# Patient Record
Sex: FEMALE | Race: WHITE | Employment: PART TIME | URBAN - METROPOLITAN AREA
[De-identification: names, ages, dates, MRNs, and addresses within clinical notes are randomized per-mention and may not be internally consistent; named-entity substitution may affect disease eponyms.]

---

## 2017-05-31 ENCOUNTER — HOSPITAL ENCOUNTER (EMERGENCY)
Age: 42
Discharge: HOME | End: 2017-05-31
Attending: EMERGENCY MEDICINE
Payer: COMMERCIAL

## 2017-05-31 ENCOUNTER — APPOINTMENT (OUTPATIENT)
Dept: GENERAL RADIOLOGY | Age: 42
End: 2017-05-31
Attending: EMERGENCY MEDICINE
Payer: COMMERCIAL

## 2017-05-31 VITALS
BODY MASS INDEX: 29.02 KG/M2 | OXYGEN SATURATION: 99 % | HEIGHT: 64 IN | WEIGHT: 170 LBS | SYSTOLIC BLOOD PRESSURE: 136 MMHG | RESPIRATION RATE: 16 BRPM | DIASTOLIC BLOOD PRESSURE: 75 MMHG | TEMPERATURE: 97.9 F | HEART RATE: 91 BPM

## 2017-05-31 DIAGNOSIS — J06.9 ACUTE UPPER RESPIRATORY INFECTION: ICD-10-CM

## 2017-05-31 DIAGNOSIS — J02.9 ACUTE PHARYNGITIS, UNSPECIFIED ETIOLOGY: Primary | ICD-10-CM

## 2017-05-31 LAB — DEPRECATED S PYO AG THROAT QL EIA: NEGATIVE

## 2017-05-31 PROCEDURE — 71020 XR CHEST PA LAT: CPT

## 2017-05-31 PROCEDURE — 74011636637 HC RX REV CODE- 636/637: Performed by: EMERGENCY MEDICINE

## 2017-05-31 PROCEDURE — 87070 CULTURE OTHR SPECIMN AEROBIC: CPT | Performed by: EMERGENCY MEDICINE

## 2017-05-31 PROCEDURE — 99283 EMERGENCY DEPT VISIT LOW MDM: CPT

## 2017-05-31 PROCEDURE — 87880 STREP A ASSAY W/OPTIC: CPT | Performed by: EMERGENCY MEDICINE

## 2017-05-31 RX ORDER — PREDNISONE 20 MG/1
40 TABLET ORAL
Status: COMPLETED | OUTPATIENT
Start: 2017-05-31 | End: 2017-05-31

## 2017-05-31 RX ORDER — PREDNISONE 20 MG/1
20 TABLET ORAL DAILY
Qty: 5 TAB | Refills: 0 | Status: SHIPPED | OUTPATIENT
Start: 2017-05-31 | End: 2017-06-05

## 2017-05-31 RX ADMIN — PREDNISONE 40 MG: 20 TABLET ORAL at 21:02

## 2017-06-01 NOTE — DISCHARGE INSTRUCTIONS
Sore Throat: Care Instructions  Your Care Instructions    Infection by bacteria or a virus causes most sore throats. Cigarette smoke, dry air, air pollution, allergies, and yelling can also cause a sore throat. Sore throats can be painful and annoying. Fortunately, most sore throats go away on their own. If you have a bacterial infection, your doctor may prescribe antibiotics. Follow-up care is a key part of your treatment and safety. Be sure to make and go to all appointments, and call your doctor if you are having problems. It's also a good idea to know your test results and keep a list of the medicines you take. How can you care for yourself at home? · If your doctor prescribed antibiotics, take them as directed. Do not stop taking them just because you feel better. You need to take the full course of antibiotics. · Gargle with warm salt water once an hour to help reduce swelling and relieve discomfort. Use 1 teaspoon of salt mixed in 1 cup of warm water. · Take an over-the-counter pain medicine, such as acetaminophen (Tylenol), ibuprofen (Advil, Motrin), or naproxen (Aleve). Read and follow all instructions on the label. · Be careful when taking over-the-counter cold or flu medicines and Tylenol at the same time. Many of these medicines have acetaminophen, which is Tylenol. Read the labels to make sure that you are not taking more than the recommended dose. Too much acetaminophen (Tylenol) can be harmful. · Drink plenty of fluids. Fluids may help soothe an irritated throat. Hot fluids, such as tea or soup, may help decrease throat pain. · Use over-the-counter throat lozenges to soothe pain. Regular cough drops or hard candy may also help. These should not be given to young children because of the risk of choking. · Do not smoke or allow others to smoke around you. If you need help quitting, talk to your doctor about stop-smoking programs and medicines.  These can increase your chances of quitting for good. · Use a vaporizer or humidifier to add moisture to your bedroom. Follow the directions for cleaning the machine. When should you call for help? Call your doctor now or seek immediate medical care if:  · You have new or worse trouble swallowing. · Your sore throat gets much worse on one side. Watch closely for changes in your health, and be sure to contact your doctor if you do not get better as expected. Where can you learn more? Go to http://audra-joya.info/. Enter [id number] [id number] in the search box to learn more about \"Sore Throat: Care Instructions. \"  Current as of: July 29, 2016  Content Version: 11.2  © 1979-0984 [employer]. Care instructions adapted under license by [email or web address] (which disclaims liability or warranty for this information). If you have questions about a medical condition or this instruction, always ask your healthcare professional. [place] [address] any warranty or liability for your use of this information. Upper Respiratory Infection (Cold): Care Instructions  Your Care Instructions    An upper respiratory infection, or URI, is an infection of the nose, sinuses, or throat. URIs are spread by coughs, sneezes, and direct contact. The common cold is the most frequent kind of URI. The flu and sinus infections are other kinds of URIs. Almost all URIs are caused by viruses. Antibiotics won't cure them. But you can treat most infections with home care. This may include drinking lots of fluids and taking over-the-counter pain medicine. You will probably feel better in 4 to 10 days. The doctor has checked you carefully, but problems can develop later. If you notice any problems or new symptoms, get medical treatment right away. Follow-up care is a key part of your treatment and safety. Be sure to make and go to all appointments, and call your doctor if you are having problems.  It's also a good idea to know your test results and keep a list of the medicines you take. How can you care for yourself at home? · To prevent dehydration, drink plenty of fluids, enough so that your urine is light yellow or clear like water. Choose water and other caffeine-free clear liquids until you feel better. If you have kidney, heart, or liver disease and have to limit fluids, talk with your doctor before you increase the amount of fluids you drink. · Take an over-the-counter pain medicine, such as acetaminophen (Tylenol), ibuprofen (Advil, Motrin), or naproxen (Aleve). Read and follow all instructions on the label. · Before you use cough and cold medicines, check the label. These medicines may not be safe for young children or for people with certain health problems. · Be careful when taking over-the-counter cold or flu medicines and Tylenol at the same time. Many of these medicines have acetaminophen, which is Tylenol. Read the labels to make sure that you are not taking more than the recommended dose. Too much acetaminophen (Tylenol) can be harmful. · Get plenty of rest.  · Do not smoke or allow others to smoke around you. If you need help quitting, talk to your doctor about stop-smoking programs and medicines. These can increase your chances of quitting for good. When should you call for help? Call 911 anytime you think you may need emergency care. For example, call if:  · You have severe trouble breathing. Call your doctor now or seek immediate medical care if:  · You seem to be getting much sicker. · You have new or worse trouble breathing. · You have a new or higher fever. · You have a new rash. Watch closely for changes in your health, and be sure to contact your doctor if:  · You have a new symptom, such as a sore throat, an earache, or sinus pain. · You cough more deeply or more often, especially if you notice more mucus or a change in the color of your mucus. · You do not get better as expected. Where can you learn more?   Go to http://audra-joya.info/. Enter B778 in the search box to learn more about \"Upper Respiratory Infection (Cold): Care Instructions. \"  Current as of: June 30, 2016  Content Version: 11.2  © 2373-7308 [employer]. Care instructions adapted under license by [employer] (which disclaims liability or warranty for this information). If you have questions about a medical condition or this instruction, always ask your healthcare professional. [address] any warranty or liability for your use of this information.

## 2017-06-01 NOTE — ED PROVIDER NOTES
HPI Comments: 46 y.o. female with past medical history significant for cystic fibrosis carrier, asthma, and RA who presents from home with chief complaint of sore throat. Pt states that for the past 6 days she has been experiencing sore throat with a productive cough of green sputum, wheezing and CP with cough. Pt also notes that she has had a fever which has since resolved for the past 2 days. Pt claims that today her right tonsil became very swollen and sore, prompting her to take Advil which finally brought the swelling down. Pt reports that she has been taking Mucinex and Sudafed as well. Pt denies SOB, vomiting or diarrhea. Pt denies hx of PNA. There are no other acute medical concerns at this time. PCP: Harsha Chávez MD    Note written by Isidro Hughes. Danita Potts, as dictated by Merry Jarrett MD 8:07 PM       The history is provided by the patient. No  was used. Past Medical History:   Diagnosis Date    Asthma     as a child    Chronic pain     hands, knees    Cystic fibrosis carrier     Hypoglycemia     PUD (peptic ulcer disease)     [facility])     Dr. Lemus Many Unspecified adverse effect of anesthesia     \"anesthesia difficult to kick in.\"       Past Surgical History:   Procedure Laterality Date    HX GYN  9/2014    uterine ablation    HX KNEE ARTHROSCOPY Left 05/2016    meniscal repair    HX ORTHOPAEDIC Right 2004    right hand, nerve repair    HX TYMPANOSTOMY Bilateral     HX UROLOGICAL  6/13/14    Urodynamics         Family History:   Problem Relation Age of Onset   26 [facility] Arthritis-rheumatoid Mother     Diabetes Mother     SLE Mother     High Cholesterol Father     Coronary Artery Disease Paternal Grandfather        Social History     Social History    Marital status:      Spouse name: N/A    Number of children: N/A    Years of education: N/A     Occupational History    Not on file.      Social History Main Topics    Smoking status: Former Smoker     Packs/day: 0.25     Years: 4.00     Quit date: 5/14/1999    Smokeless tobacco: Never Used    Alcohol use 0.0 oz/week      Comment: occasionally    Drug use: No    Sexual activity: Yes     Partners: Male     Birth control/ protection: Surgical      Comment: vasectomy     Other Topics Concern    Not on file     Social History Narrative         ALLERGIES: Sulfa (sulfonamide antibiotics)    Review of Systems   Constitutional: Positive for fever. HENT: Positive for sore throat. Eyes: Negative for visual disturbance. Respiratory: Positive for cough and wheezing. Negative for shortness of breath. Cardiovascular: Negative for leg swelling. Gastrointestinal: Negative for abdominal pain, diarrhea, nausea and vomiting. Genitourinary: Negative for dysuria. Musculoskeletal: Negative. Negative for back pain and neck stiffness. Skin: Negative for rash. Neurological: Negative. Negative for syncope and headaches. Psychiatric/Behavioral: Negative for confusion. All other systems reviewed and are negative. Vitals:    05/31/17 2002   BP: 136/75   Pulse: 91   Resp: 16   Temp: 97.9 °F (36.6 °C)   SpO2: 99%   Weight: 77.1 kg (170 lb)   Height: 5' 4\" (1.626 m)            Physical Exam   Constitutional: She appears well-developed and well-nourished. No distress. HENT:   Head: Normocephalic. Oral pharynx is red, no exudates or swelling of the tonsils. Eyes: Pupils are equal, round, and reactive to light. Neck: Normal range of motion. Cardiovascular: Normal rate and regular rhythm. No murmur heard. Pulmonary/Chest: Effort normal and breath sounds normal. No respiratory distress. Abdominal: Soft. There is no tenderness. Musculoskeletal: Normal range of motion. She exhibits no edema. Lymphadenopathy:     She has cervical adenopathy (mild swelling of the right anterior nodes). Neurological: She is alert. She has normal strength. No cranial nerve deficit.    Skin: Skin is warm and dry. Psychiatric: She has a normal mood and affect. Her behavior is normal.   Nursing note and vitals reviewed. Note written by Claude Alexander. Jameson Jo, as dictated by Jose Vinson MD 8:07 PM      [facility]  ED Course       Procedures    Discussed test results, clinical impression,  and need for followup in 1-2 days if not improving. Patients questions were answered. Discharge instructions given to patient.

## 2017-06-01 NOTE — ED TRIAGE NOTES
Pt c/o sore throat, cough, and swollen tonsils since Thursday. She has taken OTC medications but it has not helped.

## 2017-06-02 ENCOUNTER — TELEPHONE (OUTPATIENT)
Dept: INTERNAL MEDICINE CLINIC | Age: 42
End: 2017-06-02

## 2017-06-02 LAB
BACTERIA SPEC CULT: NORMAL
SERVICE CMNT-IMP: NORMAL

## 2017-06-02 NOTE — TELEPHONE ENCOUNTER
Patient wants to speak with nurse regarding her throat. She has been but on Prednisone and has been to the ER for this as well and she is not any better wants to see Dr Ferny Weston today because the weekend is here.  Her no is [phone]

## 2017-06-02 NOTE — TELEPHONE ENCOUNTER
LM informing patient to finish taking the medication as prescribed. She can call back to schedule an appointment for Monday if still no better or go to an urgent care facility if become worse over the weekend.

## 2017-06-28 ENCOUNTER — TELEPHONE (OUTPATIENT)
Dept: OBGYN CLINIC | Age: 42
End: 2017-06-28

## 2017-06-28 NOTE — TELEPHONE ENCOUNTER
Patient calling stating that she has left breast tenderness and sometimes pain that is 4 out of 10 pain scale and located at 3:00 on the left breast.  Patient noticed that each month she would have this pain with her cycles but just a few days ago, the pain seems to come and go without her cycles now and would like to come in and be seen. Based on patient's scheduled, appt scheduled for 07/03/17.

## 2017-07-03 ENCOUNTER — OFFICE VISIT (OUTPATIENT)
Dept: OBGYN CLINIC | Age: 42
End: 2017-07-03

## 2017-07-03 VITALS
BODY MASS INDEX: 29.02 KG/M2 | HEIGHT: 64 IN | DIASTOLIC BLOOD PRESSURE: 80 MMHG | SYSTOLIC BLOOD PRESSURE: 116 MMHG | WEIGHT: 170 LBS

## 2017-07-03 DIAGNOSIS — N64.4 BREAST PAIN IN FEMALE: Primary | ICD-10-CM

## 2017-07-03 NOTE — PATIENT INSTRUCTIONS
Breast Self-Exam: Care Instructions  Your Care Instructions  A breast self-exam is when you check your breasts for lumps or changes. This regular exam helps you learn how your breasts normally look and feel. Most breast problems or changes are not because of cancer. Breast self-exam is not a substitute for a mammogram. Having regular breast exams by your doctor and regular mammograms improve your chances of finding any problems with your breasts. Some women set a time each month to do a step-by-step breast self-exam. Other women like a less formal system. They might look at their breasts as they brush their teeth, or feel their breasts once in a while in the shower. If you notice a change in your breast, tell your doctor. Follow-up care is a key part of your treatment and safety. Be sure to make and go to all appointments, and call your doctor if you are having problems. Its also a good idea to know your test results and keep a list of the medicines you take. How do you do a breast self-exam?  · The best time to examine your breasts is usually one week after your menstrual period begins. Your breasts should not be tender then. If you do not have periods, you might do your exam on a day of the month that is easy to remember. · To examine your breasts:  ¨ Remove all your clothes above the waist and lie down. When you are lying down, your breast tissue spreads evenly over your chest wall, which makes it easier to feel all your breast tissue. ¨ Use the padsnot the fingertipsof the 3 middle fingers of your left hand to check your right breast. Move your fingers slowly in small coin-sized circles that overlap. ¨ Use three levels of pressure to feel of all your breast tissue. Use light pressure to feel the tissue close to the skin surface. Use medium pressure to feel a little deeper. Use firm pressure to feel your tissue close to your breastbone and ribs.  Use each pressure level to feel your breast tissue before moving on to the next spot. ¨ Check your entire breast, moving up and down as if following a strip from the collarbone to the bra line, and from the armpit to the ribs. Repeat until you have covered the entire breast.  ¨ Repeat this procedure for your left breast, using the pads of the 3 middle fingers of your right hand. · To examine your breasts while in the shower:  ¨ Place one arm over your head and lightly soap your breast on that side. ¨ Using the pads of your fingers, gently move your hand over your breast (in the strip pattern described above), feeling carefully for any lumps or changes. ¨ Repeat for the other breast.  · Have your doctor inspect anything you notice to see if you need further testing. Where can you learn more? Go to http://audra-joya.info/. Enter P148 in the search box to learn more about \"Breast Self-Exam: Care Instructions. \"  Current as of: July 26, 2016  Content Version: 11.3  © 0268-3212 [employer]. Care instructions adapted under license by [employer] (which disclaims liability or warranty for this information). If you have questions about a medical condition or this instruction, always ask your healthcare professional. [place] [address] any warranty or liability for your use of this information.

## 2017-07-03 NOTE — PROGRESS NOTES
Breast Pain Evaluation    Erick Rivera is a ,  46 y.o. female [facility] Patient's last menstrual period was 2017. She presents with breast pain located in the left breast at 3 o'clock. She noticed it several months ago. The pain has not changed in intensity. The patient has not noticed changes in the area of the pain: No dimpling, nipple retraction or discharge. No masses or nodes. .    The patient notes the following associated symptoms: breast pain bilateral--during her cycle, but the L breast pain has lingered after menses stopped. She notes that the following factors improve her symptoms: none    She notes that the following factors aggravate her symptoms:none    She has had any diagnostic studies for this problem since she noticed it. With regards to breast problems her medical history is significant for the following: none    There is not a family history of breast cancer in a first and second degree relative. Past Medical History:   Diagnosis Date    Asthma     as a child    Chronic pain     hands, knees    Cystic fibrosis carrier     Hypoglycemia     PUD (peptic ulcer disease)     RA [facility]he[facility]oid [facility]) ([facility]     Dr. Debra Benoit Unspecified adverse effect of anesthesia     \"anesthesia difficult to kick in.\"     Past Surgical History:   Procedure Laterality Date    HX GYN  2014    uterine ablation    HX KNEE ARTHROSCOPY Left 2016    meniscal repair    HX ORTHOPAEDIC Right     right hand, nerve repair    HX TYMPANOSTOMY Bilateral     HX UROLOGICAL  14    Urodynamics     Social History     Occupational History    Not on file.      Social History Main Topics    Smoking status: Former Smoker     Packs/day: 0.25     Years: 4.00     Quit date: 1999    Smokeless tobacco: Never Used    Alcohol use 0.0 oz/week      Comment: occasionally    Drug use: No    Sexual activity: Yes     Partners: Male     Birth control/ protection: Surgical Comment: vasectomy     Family History   Problem Relation Age of Onset   [facility] Arthritis-rheumatoid Mother     Diabetes Mother     SLE Mother     High Cholesterol Father     Coronary Artery Disease Paternal Grandfather        Allergies   Allergen Reactions    Sulfa (Sulfonamide Antibiotics) Other (comments)     As a child. \" Not sure what happened, possibly some breathing problems. \"     Prior to Admission medications    Medication Sig Start Date End Date Taking? Authorizing Provider   betamethasone, augmented (DIPROLENE) 0.05 % lotion Apply  to affected area two (2) times a day. 9/13/16   Shin Quiroz, NP   cefUROXime (CEFTIN) 500 mg tablet Take 1 Tab by mouth two (2) times a day. 9/13/16   Shin Quiroz NP   PV W-O DAMON/FERROUS FUMARATE/FA (M-VIT PO) take  by mouth.     Historical Provider        Review of Systems: History obtained from the patient  Constitutional: negative for weight loss, fever, night sweats  HEENT: negative for hearing loss, earache, congestion, snoring, sorethroat  CV: negative for chest pain, palpitations, edema  Resp: negative for cough, shortness of breath, wheezing  Breast: see above  GI: negative for change in bowel habits, abdominal pain, black or bloody stools  : negative for frequency, dysuria, hematuria, vaginal discharge  MSK: negative for back pain, joint pain, muscle pain  Skin: negative for itching, rash, hives  Neuro: negative for dizziness, headache, confusion, weakness  Psych: negative for anxiety, depression, change in mood  Heme/lymph: negative for bleeding, bruising, pallor    Objective:  Visit Vitals    /80    Ht 5' 4\" (1.626 m)    Wt 170 lb (77.1 kg)    BMI 29.18 kg/m2     Exam:  Constitutional  · Appearance: well-nourished, well developed, alert, in no acute distress    HENT  · Head and Face: appears normal    Neck  · Inspection/Palpation: normal appearance, no masses or tenderness  · Lymph Nodes: no lymphadenopathy present  · Thyroid: gland size normal, nontender, no nodules or masses present on palpation    Breasts  · Inspection of Breasts: breasts symmetrical, no skin changes, no discharge present, nipple appearance normal, no skin retraction present  · Palpation of Breasts and Axillae: no masses present on palpation, no breast tenderness  · Axillary Lymph Nodes: no lymphadenopathy present    Assessment:  Breast pain, normal exam  Plan:  Mammo/AE  Try vit E, primrose oil

## 2017-07-03 NOTE — MR AVS SNAPSHOT
Visit Information Date & Time Provider Department Dept. Phone Encounter #  
 7/3/2017  9:50 AM Desire Peacock MD [facility] [phone] [id number] Upcoming Health Maintenance Date Due  
 PAP AKA CERVICAL CYTOLOGY 1/1/2017 INFLUENZA AGE 9 TO ADULT 8/1/2017 Allergies as of 7/3/2017  Review Complete On: 7/3/2017 By: Bessie Fields Severity Noted Reaction Type Reactions Sulfa (Sulfonamide Antibiotics)  05/14/2009    Other (comments) As a child. \" Not sure what happened, possibly some breathing problems. \" Current Immunizations  Reviewed on 12/10/2014 Name Date Influenza Vaccine Whole 9/19/2012 Tdap 3/27/2013 Not reviewed this visit Vitals BP Height(growth percentile) Weight(growth percentile) LMP BMI OB Status 116/80 5' 4\" (1.626 m) 170 lb (77.1 kg) 06/26/2017 29.18 kg/m2 Having regular periods Smoking Status Former Smoker BMI and BSA Data Body Mass Index Body Surface Area  
 29.18 kg/m 2 1.87 m 2 Preferred Pharmacy Pharmacy Name Phone [facility] [address]  [facility] [phone] Your Updated Medication List  
  
   
This list is accurate as of: 7/3/17 10:15 AM.  Always use your most recent med list.  
  
  
  
  
 betamethasone, augmented 0.05 % lotion Commonly known as:  Sharwill Marla Apply  to affected area two (2) times a day. cefUROXime 500 mg tablet Commonly known as:  CEFTIN Take 1 Tab by mouth two (2) times a day. M-VIT PO  
take  by mouth. Patient Instructions Breast Self-Exam: Care Instructions Your Care Instructions A breast self-exam is when you check your breasts for lumps or changes. This regular exam helps you learn how your breasts normally look and feel. Most breast problems or changes are not because of cancer.  
Breast self-exam is not a substitute for a mammogram. Having regular breast exams by your doctor and regular mammograms improve your chances of finding any problems with your breasts. Some women set a time each month to do a step-by-step breast self-exam. Other women like a less formal system. They might look at their breasts as they brush their teeth, or feel their breasts once in a while in the shower. If you notice a change in your breast, tell your doctor. Follow-up care is a key part of your treatment and safety. Be sure to make and go to all appointments, and call your doctor if you are having problems. Its also a good idea to know your test results and keep a list of the medicines you take. How do you do a breast self-exam? 
· The best time to examine your breasts is usually one week after your menstrual period begins. Your breasts should not be tender then. If you do not have periods, you might do your exam on a day of the month that is easy to remember. · To examine your breasts: ¨ Remove all your clothes above the waist and lie down. When you are lying down, your breast tissue spreads evenly over your chest wall, which makes it easier to feel all your breast tissue. ¨ Use the padsnot the fingertipsof the 3 middle fingers of your left hand to check your right breast. Move your fingers slowly in small coin-sized circles that overlap. ¨ Use three levels of pressure to feel of all your breast tissue. Use light pressure to feel the tissue close to the skin surface. Use medium pressure to feel a little deeper. Use firm pressure to feel your tissue close to your breastbone and ribs. Use each pressure level to feel your breast tissue before moving on to the next spot. ¨ Check your entire breast, moving up and down as if following a strip from the collarbone to the bra line, and from the armpit to the ribs. Repeat until you have covered the entire breast. 
¨ Repeat this procedure for your left breast, using the pads of the 3 middle fingers of your right hand. · To examine your breasts while in the shower: 
¨ Place one arm over your head and lightly soap your breast on that side. ¨ Using the pads of your fingers, gently move your hand over your breast (in the strip pattern described above), feeling carefully for any lumps or changes. ¨ Repeat for the other breast. 
· Have your doctor inspect anything you notice to see if you need further testing. Where can you learn more? Go to http://audra-joya.info/. Enter P148 in the search box to learn more about \"Breast Self-Exam: Care Instructions. \" Current as of: July 26, 2016 Content Version: 11.3 © 0581-1771 [email or web address]. Care instructions adapted under license by [employer] (which disclaims liability or warranty for this information). If you have questions about a medical condition or this instruction, always ask your healthcare professional. [address] any warranty or liability for your use of this information. Please provide this summary of care documentation to your next provider. Your primary care clinician is listed as Jory Zacarias. If you have any questions after today's visit, please call [phone].

## 2017-08-24 ENCOUNTER — OFFICE VISIT (OUTPATIENT)
Dept: OBGYN CLINIC | Age: 42
End: 2017-08-24

## 2017-08-24 VITALS
BODY MASS INDEX: 30.39 KG/M2 | HEIGHT: 64 IN | WEIGHT: 178 LBS | SYSTOLIC BLOOD PRESSURE: 110 MMHG | DIASTOLIC BLOOD PRESSURE: 62 MMHG

## 2017-08-24 DIAGNOSIS — Z01.419 ROUTINE GYNECOLOGICAL EXAMINATION: Primary | ICD-10-CM

## 2017-08-24 DIAGNOSIS — Z11.51 SPECIAL SCREENING EXAMINATION FOR HUMAN PAPILLOMAVIRUS (HPV): ICD-10-CM

## 2017-08-24 NOTE — PATIENT INSTRUCTIONS
Breast Self-Exam: Care Instructions  Your Care Instructions  A breast self-exam is when you check your breasts for lumps or changes. This regular exam helps you learn how your breasts normally look and feel. Most breast problems or changes are not because of cancer. Breast self-exam is not a substitute for a mammogram. Having regular breast exams by your doctor and regular mammograms improve your chances of finding any problems with your breasts. Some women set a time each month to do a step-by-step breast self-exam. Other women like a less formal system. They might look at their breasts as they brush their teeth, or feel their breasts once in a while in the shower. If you notice a change in your breast, tell your doctor. Follow-up care is a key part of your treatment and safety. Be sure to make and go to all appointments, and call your doctor if you are having problems. Its also a good idea to know your test results and keep a list of the medicines you take. How do you do a breast self-exam?  · The best time to examine your breasts is usually one week after your menstrual period begins. Your breasts should not be tender then. If you do not have periods, you might do your exam on a day of the month that is easy to remember. · To examine your breasts:  ¨ Remove all your clothes above the waist and lie down. When you are lying down, your breast tissue spreads evenly over your chest wall, which makes it easier to feel all your breast tissue. ¨ Use the pads--not the fingertips--of the 3 middle fingers of your left hand to check your right breast. Move your fingers slowly in small coin-sized circles that overlap. ¨ Use three levels of pressure to feel of all your breast tissue. Use light pressure to feel the tissue close to the skin surface. Use medium pressure to feel a little deeper. Use firm pressure to feel your tissue close to your breastbone and ribs.  Use each pressure level to feel your breast tissue before moving on to the next spot. ¨ Check your entire breast, moving up and down as if following a strip from the collarbone to the bra line, and from the armpit to the ribs. Repeat until you have covered the entire breast.  ¨ Repeat this procedure for your left breast, using the pads of the 3 middle fingers of your right hand. · To examine your breasts while in the shower:  ¨ Place one arm over your head and lightly soap your breast on that side. ¨ Using the pads of your fingers, gently move your hand over your breast (in the strip pattern described above), feeling carefully for any lumps or changes. ¨ Repeat for the other breast.  · Have your doctor inspect anything you notice to see if you need further testing. Where can you learn more? Go to http://audra-joya.info/. Enter P148 in the search box to learn more about \"Breast Self-Exam: Care Instructions. \"  Current as of: July 26, 2016  Content Version: 11.3  © 8076-2227 [employer]. Care instructions adapted under license by "[employer] (which disclaims liability or warranty for this information). If you have questions about a medical condition or this instruction, always ask your healthcare professional. [place] [address] any warranty or liability for your use of this information.

## 2017-08-24 NOTE — PROGRESS NOTES
Erick Rivera is a ,  46 y.o. female [facility] whose No LMP recorded. who presents for her annual checkup. She is having no significant problems. With regard to the Gardisil vaccine, she is older than the FDA approved age to receive it. Menstrual status:    Her periods are minimal to nonexistent in flow. She is using essentially none pads or tampons per day, patient has hx of ablation. .    She denies dysmenorrhea. She reports no premenstrual symptoms. Contraception:    The current method of family planning is vasectomy and She declines contraception and counseling. Sexual history:    She  reports that she currently engages in sexual activity and has had male partners. She reports using the following method of birth control/protection: Surgical.    Medical conditions:    Since her last annual GYN exam about 2014 ago, she has not the following changes in her health history: none. Pap and Mammogram History:    Her most recent Pap smear was normal/-HPV obtained 2011 year(s) ago. The patient had her mammogram today in our office. The patient does not have a family history of breast cancer.     Past Medical History:   Diagnosis Date    Arthritis     Psoriatic arthritis ([facility] [address] (L40.50)    Asthma     as a child    Chronic pain     hands, knees    Cystic fibrosis carrier     Hypoglycemia     Psoriatic arthritis (HCC)     PUD (peptic ulcer disease)     Unspecified adverse effect of anesthesia     \"anesthesia difficult to kick in.\"     Past Surgical History:   Procedure Laterality Date    HX GYN  2014    uterine ablation    HX KNEE ARTHROSCOPY Left 2016    meniscal repair    HX ORTHOPAEDIC Right     right hand, nerve repair    HX TYMPANOSTOMY Bilateral     HX UROLOGICAL  14    Urodynamics       Current Outpatient Prescriptions   Medication Sig Dispense Refill    betamethasone, augmented (DIPROLENE) 0.05 % lotion Apply  to affected area two (2) times a day. 60 mL 2    cefUROXime (CEFTIN) 500 mg tablet Take 1 Tab by mouth two (2) times a day. 14 Tab 0     Allergies: Sulfa (sulfonamide antibiotics)   Social History     Social History    Marital status:      Spouse name: N/A    Number of children: N/A    Years of education: N/A     Occupational History    Not on file. Social History Main Topics    Smoking status: Former Smoker     Packs/day: 0.25     Years: 4.00     Quit date: 5/14/1999    Smokeless tobacco: Never Used    Alcohol use 0.0 oz/week      Comment: occasionally    Drug use: No    Sexual activity: Yes     Partners: Male     Birth control/ protection: Surgical      Comment: vasectomy     Other Topics Concern    Not on file     Social History Narrative     Tobacco History:  reports that she quit smoking about 18 years ago. She has a 1.00 pack-year smoking history. She has never used smokeless tobacco.  Alcohol Abuse:  reports that she drinks alcohol. Drug Abuse:  reports that she does not use illicit drugs.     Patient Active Problem List   Diagnosis Code    RA (rheumatoid arthritis) ([facility] [address] M06.9    PUD (peptic ulcer disease) K27.9    Hypoglycemia E16.2    Menorrhagia N92.0    Fibrocystic disease of breast N60.19    Urinary incontinence R32       Review of Systems - History obtained from the patient  Constitutional: negative for weight loss, fever, night sweats  HEENT: negative for hearing loss, earache, congestion, snoring, sorethroat  CV: negative for chest pain, palpitations, edema  Resp: negative for cough, shortness of breath, wheezing  GI: negative for change in bowel habits, abdominal pain, black or bloody stools  : negative for frequency, dysuria, hematuria, vaginal discharge  MSK: negative for back pain, joint pain, muscle pain  Breast: negative for breast lumps, nipple discharge, galactorrhea  Skin :negative for itching, rash, hives  Neuro: negative for dizziness, headache, confusion, weakness  Psych: negative for anxiety, depression, change in mood  Heme/lymph: negative for bleeding, bruising, pallor    Physical Exam    Visit Vitals    /62    Ht 5' 4\" (1.626 m)    Wt 178 lb (80.7 kg)    BMI 30.55 kg/m2       Constitutional  · Appearance: well-nourished, well developed, alert, in no acute distress    HENT  · Head and Face: appears normal    Neck  · Inspection/Palpation: normal appearance, no masses or tenderness  · Lymph Nodes: no lymphadenopathy present  · Thyroid: gland size normal, nontender, no nodules or masses present on palpation    Chest  · Respiratory Effort: breathing normal  · Auscultation: normal breath sounds    Cardiovascular  · Heart:  · Auscultation: regular rate and rhythm without murmur    Breasts  · Inspection of Breasts: breasts symmetrical, no skin changes, no discharge present, nipple appearance normal, no skin retraction present  · Palpation of Breasts and Axillae: no masses present on palpation, no breast tenderness  · Axillary Lymph Nodes: no lymphadenopathy present    Gastrointestinal  · Abdominal Examination: abdomen non-tender to palpation, normal bowel sounds, no masses present  · Liver and spleen: no hepatomegaly present, spleen not palpable  · Hernias: no hernias identified    Genitourinary  · External Genitalia: normal appearance for age, no discharge present, no tenderness present, no inflammatory lesions present, no masses present, no atrophy present  · Vagina: normal vaginal vault without central or paravaginal defects, no discharge present, no inflammatory lesions present, no masses present  · Bladder: non-tender to palpation  · Urethra: appears normal  · Cervix: normal   · Uterus: normal size, shape and consistency  · Adnexa: no adnexal tenderness present, no adnexal masses present  · Perineum: perineum within normal limits, no evidence of trauma, no rashes or skin lesions present  · Anus: anus within normal limits, no hemorrhoids present  · Inguinal Lymph Nodes: no lymphadenopathy present    Skin  · General Inspection: no rash, no lesions identified    Neurologic/Psychiatric  · Mental Status:  · Orientation: grossly oriented to person, place and time  · Mood and Affect: mood normal, affect appropriate    . Assessment:  Routine gynecologic examination  Her current medical status is satisfactory with no evidence of significant gynecologic issues.     Plan:  Counseled re: diet, exercise, healthy lifestyle  Return for yearly wellness visits  Pt counseled regarding co-testing for high risk HPV with pap  Rec screening mammo

## 2017-08-28 LAB
CYTOLOGIST CVX/VAG CYTO: NORMAL
CYTOLOGY CVX/VAG DOC THIN PREP: NORMAL
CYTOLOGY HISTORY:: NORMAL
DX ICD CODE: NORMAL
HPV I/H RISK 1 DNA CVX QL PROBE+SIG AMP: NEGATIVE
Lab: NORMAL
OTHER STN SPEC: NORMAL
PATH REPORT.FINAL DX SPEC: NORMAL
STAT OF ADQ CVX/VAG CYTO-IMP: NORMAL

## 2018-01-29 ENCOUNTER — TELEPHONE (OUTPATIENT)
Dept: OBGYN CLINIC | Age: 43
End: 2018-01-29

## 2018-01-29 NOTE — TELEPHONE ENCOUNTER
Patient is calling, usually sees TH. Patient states that years ago she had an ultrasound done and she does not recall if it showed a ovarian cyst.  Feels a lot of sharp pain around her menstrual cycle time.

## 2018-02-08 ENCOUNTER — TELEPHONE (OUTPATIENT)
Dept: OBGYN CLINIC | Age: 43
End: 2018-02-08

## 2018-02-08 NOTE — TELEPHONE ENCOUNTER
Patient advised of MD recommendations and verbalized understanding and will call back to schedule the appointment

## 2018-02-08 NOTE — TELEPHONE ENCOUNTER
Call received at 11:14am      38year old patient last seen in the office on 8/24/17 for AE. Patient calling to say that for the past year or so she has been noticing a sharp right side pain before her cycle and the first two days of her cycle. Patient reports her bleeding is light due to having had an ablation. Patient reports the pain level is 5-7 range on the pain scale. Patient reports having previous ultrasound that showed small cyst and history of kidney stones that patient states have resolved. Patient had previous ultrasounds in 2014 and 2015        ?  Ultrasound with office visit    Please advise

## 2018-02-21 ENCOUNTER — OFFICE VISIT (OUTPATIENT)
Dept: OBGYN CLINIC | Age: 43
End: 2018-02-21

## 2018-02-21 VITALS
SYSTOLIC BLOOD PRESSURE: 118 MMHG | DIASTOLIC BLOOD PRESSURE: 76 MMHG | BODY MASS INDEX: 30.39 KG/M2 | WEIGHT: 178 LBS | HEIGHT: 64 IN

## 2018-02-21 DIAGNOSIS — R10.2 PELVIC PAIN IN FEMALE: Primary | ICD-10-CM

## 2018-02-21 NOTE — PROGRESS NOTES
Pelvic Pain evaluation    Billie Kay is a 38 y.o. female who complains of intermittent pelvic pain for the past year. The pain is described as sharp. Pain is located in the [place] without radiation. Patient has a history of ablation and loop in her R ureter. Her mother had ovarian cysts and endometriosis. Pain is definitely cyclic and occurs at time of menses    The pain started 1 year ago. Her symptoms have been unchanged since. Aggravating factors: none. Alleviating factors: none. Associated symptoms: none. The patient denies additional symptoms. Ultrasound today revealed:  TRANSVAGINAL ULTRASOUND PERFORMED  UTERUS IS ANTEVERTED, NORMAL IN SIZE AND ECHOGENICITY. ENDOMETRIUM MEASURES 7MM IN THICKNESS. NO EVIDENCE OF MASSES OR ABNORMALITIES ARE SEEN. THE ENDOMETRIUM IS NOT WELL DEFINED DUE TO A HX OF ABLATION. RIGHT OVARY APPEARS WITHIN NORMAL LIMITS. LEFT OVARY APPEARS WITHIN NORMAL LIMITS. NO FREE FLUID SEEN IN THE CDS. Past Medical History:   Diagnosis Date    Arthritis     Psoriatic arthritis ([address] (L40.50)    Asthma     as a child    Chronic pain     hands, knees    Cystic fibrosis carrier     Hypoglycemia     Psoriatic arthritis (HCC)     PUD (peptic ulcer disease)     Unspecified adverse effect of anesthesia     \"anesthesia difficult to kick in.\"     Past Surgical History:   Procedure Laterality Date    HX GYN  9/2014    uterine ablation    HX KNEE ARTHROSCOPY Left 05/2016    meniscal repair    HX ORTHOPAEDIC Right 2004    right hand, nerve repair    HX TYMPANOSTOMY Bilateral     HX UROLOGICAL  6/13/14    Urodynamics     Social History     Occupational History    Not on file.      Social History Main Topics    Smoking status: Former Smoker     Packs/day: 0.25     Years: 4.00     Quit date: 5/14/1999    Smokeless tobacco: Never Used    Alcohol use 0.0 oz/week      Comment: occasionally    Drug use: No    Sexual activity: Yes     Partners: Male     Birth control/ protection: Surgical      Comment: vasectomy     Family History   Problem Relation Age of Onset   Christian Arthritis-rheumatoid Mother     Diabetes Mother     SLE Mother     High Cholesterol Father     Coronary Artery Disease Paternal Grandfather        Allergies   Allergen Reactions    Sulfa (Sulfonamide Antibiotics) Other (comments)     As a child. \" Not sure what happened, possibly some breathing problems. \"     Prior to Admission medications    Medication Sig Start Date End Date Taking? Authorizing Provider   betamethasone, augmented (DIPROLENE) 0.05 % lotion Apply  to affected area two (2) times a day. 9/13/16  Yes Robinson Moss NP   cefUROXime (CEFTIN) 500 mg tablet Take 1 Tab by mouth two (2) times a day. 9/13/16   Robinson Moss NP        Review of Systems: History obtained from the patient  Constitutional: negative for weight loss, fever, night sweats  Breast: negative for breast lumps, nipple discharge, galactorrhea  GI: negative for change in bowel habits, abdominal pain, black or bloody stools  : negative for frequency, dysuria, hematuria, vaginal discharge  MSK: negative for back pain, joint pain, muscle pain  Skin: negative for itching, rash, hives  Psych: negative for anxiety, depression, change in mood      Objective: There were no vitals taken for this visit.     Physical Exam:     Constitutional  · Appearance: well-nourished, well developed, alert, in no acute distress    Gastrointestinal  · Abdominal Examination: abdomen non-tender to palpation, normal bowel sounds, no masses present  · Liver and spleen: no hepatomegaly present, spleen not palpable  · Hernias: no hernias identified    Genitourinary  · External Genitalia: normal appearance for age, no discharge present, no tenderness present, no inflammatory lesions present, no masses present, no atrophy present  · Vagina: normal vaginal vault without central or paravaginal defects, no discharge present, no inflammatory lesions present, no masses present  · Bladder: non-tender to palpation  · Urethra: appears normal  · Cervix: normal   · Uterus: normal size, shape and consistency  · Adnexa: no adnexal tenderness present, no adnexal masses present  · Perineum: perineum within normal limits, no evidence of trauma, no rashes or skin lesions present  · Anus: anus within normal limits, no hemorrhoids present  · Inguinal Lymph Nodes: no lymphadenopathy present    Skin  · General Inspection: no rash, no lesions identified    Neurologic/Psychiatric  · Mental Status:  · Orientation: grossly oriented to person, place and time  · Mood and Affect: mood normal, affect appropriate    Assessment:  Pelvic pain related to menses  Normal ultrasound    Plan:   Declines trial of OC  FU AE or sooner if wants to try something      RTO prn if symptoms persist or worsen. Instructions given to pt. Handouts given to pt.

## 2018-02-21 NOTE — TELEPHONE ENCOUNTER
Call received at 10:40am      38year old patient calling to say that she is having the pelvic pain discussed previously. Patient placed on the schedule to be seen for ultrasound at 2:00pm and then follow up with Dr. Marianna Schlatter. Patient advised there may be some wait time to see MD after ultrasound. Patient verbalized understanding.

## 2018-08-17 ENCOUNTER — TELEPHONE (OUTPATIENT)
Dept: INTERNAL MEDICINE CLINIC | Age: 43
End: 2018-08-17

## 2018-08-17 NOTE — TELEPHONE ENCOUNTER
I called pt to confirm that she has changed PCP, pt states yes, Dr. Isidoro Kendrick is her PCP. I stated that [facility] have faxed over her recent labs to our office, pt asked that I send them to Dr. [facility] office. Office note and labs faxed.

## 2019-01-04 ENCOUNTER — OFFICE VISIT (OUTPATIENT)
Dept: OBGYN CLINIC | Age: 44
End: 2019-01-04

## 2019-01-04 VITALS
DIASTOLIC BLOOD PRESSURE: 74 MMHG | SYSTOLIC BLOOD PRESSURE: 116 MMHG | WEIGHT: 197 LBS | HEIGHT: 64 IN | BODY MASS INDEX: 33.63 KG/M2

## 2019-01-04 DIAGNOSIS — Z01.419 ENCOUNTER FOR GYNECOLOGICAL EXAMINATION (GENERAL) (ROUTINE) WITHOUT ABNORMAL FINDINGS: Primary | ICD-10-CM

## 2019-01-04 NOTE — PROGRESS NOTES
Cal Navarrete is a ,  41 y.o. female [facility] whose LMP was on  who presents for her annual checkup. She is having no significant problems. She c/o low back pain 1 week before her period. Menstrual status:    Her periods are minimal to nonexistent in flow. She is using essentially none pads or tampons per day, hx of ablation. .    She denies dysmenorrhea. She reports no premenstrual symptoms. The patient is not using HRT. Contraception:    The current method of family planning is vasectomy. Sexual history:    She  reports that she currently engages in sexual activity and has had partners who are Male. She reports using the following method of birth control/protection: Surgical.    Medical conditions:    Since her last annual GYN exam about 2017 ago, she has had the following changes in her health history: none. Pap and Mammogram History:    Her most recent Pap smear was normal/-HPV obtained 2017 year(s) ago. The patient had her mammogram today in our office. Breast Cancer History/Substance Abuse:    She has no family history of breast cancer. Osteoporosis History:    Family history does not include a first or second degree relative with osteopenia or osteoporosis.     A bone density scan was not obtained       Past Medical History:   Diagnosis Date    Arthritis     Psoriatic arthritis ([address] (L40.50)    Asthma     as a child    Chronic pain     hands, knees    Cystic fibrosis carrier     Hypoglycemia     Psoriatic arthritis ([address]     PUD (peptic ulcer disease)     Unspecified adverse effect of anesthesia     \"anesthesia difficult to kick in.\"     Past Surgical History:   Procedure Laterality Date    HX GYN  2014    uterine ablation    HX KNEE ARTHROSCOPY Left 2016    meniscal repair    HX ORTHOPAEDIC Right     right hand, nerve repair    HX TYMPANOSTOMY Bilateral     HX UROLOGICAL  14    Urodynamics     Current Outpatient Medications   Medication Sig Dispense Refill    betamethasone, augmented (DIPROLENE) 0.05 % lotion Apply  to affected area two (2) times a day. 60 mL 2     Allergies: Sulfa (sulfonamide antibiotics)   Social History     Socioeconomic History    Marital status:      Spouse name: Not on file    Number of children: Not on file    Years of education: Not on file    Highest education level: Not on file   Social Needs    Financial resource strain: Not on file    Food insecurity - worry: Not on file    Food insecurity - inability: Not on file    Transportation needs - medical: Not on file   Xoft needs - non-medical: Not on file   Occupational History    Not on file   Tobacco Use    Smoking status: Former Smoker     Packs/day: 0.25     Years: 4.00     Pack years: 1.00     Last attempt to quit: 1999     Years since quittin.6    Smokeless tobacco: Never Used   Substance and Sexual Activity    Alcohol use: Yes     Alcohol/week: 0.0 oz     Comment: occasionally    Drug use: No    Sexual activity: Yes     Partners: Male     Birth control/protection: Surgical     Comment: vasectomy   Other Topics Concern    Not on file   Social History Narrative    Not on file     Tobacco History:  reports that she quit smoking about 19 years ago. She has a 1.00 pack-year smoking history. she has never used smokeless tobacco.  Alcohol Abuse:  reports that she drinks alcohol. Drug Abuse:  reports that she does not use drugs.   Patient Active Problem List   Diagnosis Code    RA (rheumatoid arthritis) ([facility] M06.9    PUD (peptic ulcer disease) K27.9    Hypoglycemia E16.2    Menorrhagia N92.0    Fibrocystic disease of breast N60.19    Urinary incontinence R32         Review of Systems - History obtained from the patient  Constitutional: negative for weight loss, fever, night sweats  HEENT: negative for hearing loss, earache, congestion, snoring, sorethroat  CV: negative for chest pain, palpitations, edema  Resp: negative for cough, shortness of breath, wheezing  GI: negative for change in bowel habits, abdominal pain, black or bloody stools  : negative for frequency, dysuria, hematuria, vaginal discharge  MSK: negative for back pain, joint pain, muscle pain  Breast: negative for breast lumps, nipple discharge, galactorrhea  Skin :negative for itching, rash, hives  Neuro: negative for dizziness, headache, confusion, weakness  Psych: negative for anxiety, depression, change in mood  Heme/lymph: negative for bleeding, bruising, pallor    Physical Exam    Visit Vitals  /74   Ht 5' 4\" (1.626 m)   Wt 197 lb (89.4 kg)   BMI 33.81 kg/m²     Constitutional  · Appearance: well-nourished, well developed, alert, in no acute distress    HENT  · Head and Face: appears normal    Neck  · Inspection/Palpation: normal appearance, no masses or tenderness  · Lymph Nodes: no lymphadenopathy present  · Thyroid: gland size normal, nontender, no nodules or masses present on palpation    Chest  · Respiratory Effort: breathing normal  · Auscultation: normal breath sounds    Cardiovascular  · Heart:  · Auscultation: regular rate and rhythm without murmur    Breasts  · Inspection of Breasts: breasts symmetrical, no skin changes, no discharge present, nipple appearance normal, no skin retraction present  · Palpation of Breasts and Axillae: no masses present on palpation, no breast tenderness  · Axillary Lymph Nodes: no lymphadenopathy present    Gastrointestinal  · Abdominal Examination: abdomen non-tender to palpation, normal bowel sounds, no masses present  · Liver and spleen: no hepatomegaly present, spleen not palpable  · Hernias: no hernias identified    Skin  · General Inspection: no rash, no lesions identified    Neurologic/Psychiatric  · Mental Status:  · Orientation: grossly oriented to person, place and time  · Mood and Affect: mood normal, affect appropriate    Genitourinary  · External Genitalia: normal appearance for age, no discharge present, no tenderness present, no inflammatory lesions present, no masses present, no atrophy present  · Vagina: normal vaginal vault without central or paravaginal defects, no discharge present, no inflammatory lesions present, no masses present  · Bladder: non-tender to palpation  · Urethra: appears normal  · Cervix: normal   · Uterus: normal size, shape and consistency  · Adnexa: no adnexal tenderness present, no adnexal masses present  · Perineum: perineum within normal limits, no evidence of trauma, no rashes or skin lesions present  · Anus: anus within normal limits, no hemorrhoids present  · Inguinal Lymph Nodes: no lymphadenopathy present    Assessment:  Routine gynecologic examination  Her current medical status is satisfactory with no evidence of significant gynecologic issues. Premenstrual back pain not relieved by NSAIDS  Plan:  Counseled re: diet, exercise, healthy lifestyle  Return for yearly wellness visits  Rec annual mammogram  Offered to try Cindy - pt declines.  Mother had lot of issues with endometriosis

## 2019-01-04 NOTE — PATIENT INSTRUCTIONS

## 2019-05-24 ENCOUNTER — HOSPITAL ENCOUNTER (OUTPATIENT)
Dept: CT IMAGING | Age: 44
Discharge: HOME | End: 2019-05-24
Attending: INTERNAL MEDICINE
Payer: COMMERCIAL

## 2019-05-24 DIAGNOSIS — R10.9 ABDOMINAL PAIN: ICD-10-CM

## 2019-05-24 DIAGNOSIS — R10.9 PAIN, ABDOMINAL, NONSPECIFIC: ICD-10-CM

## 2019-05-24 PROCEDURE — 74176 CT ABD & PELVIS W/O CONTRAST: CPT

## 2020-04-08 ENCOUNTER — HOSPITAL ENCOUNTER (OUTPATIENT)
Dept: LAB | Age: 45
Discharge: HOME | End: 2020-04-08

## 2020-04-08 ENCOUNTER — OFFICE VISIT (OUTPATIENT)
Dept: INTERNAL MEDICINE CLINIC | Age: 45
End: 2020-04-08

## 2020-04-08 VITALS
BODY MASS INDEX: 36.75 KG/M2 | OXYGEN SATURATION: 96 % | RESPIRATION RATE: 18 BRPM | HEART RATE: 94 BPM | TEMPERATURE: 98.1 F | SYSTOLIC BLOOD PRESSURE: 116 MMHG | HEIGHT: 64 IN | WEIGHT: 215.25 LBS | DIASTOLIC BLOOD PRESSURE: 81 MMHG

## 2020-04-08 DIAGNOSIS — R39.15 URINARY URGENCY: ICD-10-CM

## 2020-04-08 DIAGNOSIS — H92.02 LEFT EAR PAIN: Primary | ICD-10-CM

## 2020-04-08 DIAGNOSIS — L03.818 CELLULITIS OF OTHER SPECIFIED SITE: ICD-10-CM

## 2020-04-08 DIAGNOSIS — E66.9 OBESITY (BMI 30-39.9): ICD-10-CM

## 2020-04-08 DIAGNOSIS — M25.561 CHRONIC PAIN OF BOTH KNEES: ICD-10-CM

## 2020-04-08 DIAGNOSIS — L40.50 PSORIATIC ARTHRITIS (HCC): ICD-10-CM

## 2020-04-08 DIAGNOSIS — Z78.9 ALCOHOL USE: ICD-10-CM

## 2020-04-08 DIAGNOSIS — G89.29 CHRONIC PAIN OF BOTH KNEES: ICD-10-CM

## 2020-04-08 DIAGNOSIS — M25.562 CHRONIC PAIN OF BOTH KNEES: ICD-10-CM

## 2020-04-08 DIAGNOSIS — E66.01 SEVERE OBESITY (HCC): ICD-10-CM

## 2020-04-08 LAB
ANION GAP SERPL CALC-SCNC: 8 MMOL/L (ref 5–15)
BILIRUB UR QL STRIP: NEGATIVE
BUN SERPL-MCNC: 17 MG/DL (ref 6–20)
BUN/CREAT SERPL: 26 (ref 12–20)
CALCIUM SERPL-MCNC: 9.1 MG/DL (ref 8.5–10.1)
CHLORIDE SERPL-SCNC: 104 MMOL/L (ref 97–108)
CHOLEST SERPL-MCNC: 231 MG/DL
CO2 SERPL-SCNC: 27 MMOL/L (ref 21–32)
CREAT SERPL-MCNC: 0.66 MG/DL (ref 0.55–1.02)
EST. AVERAGE GLUCOSE BLD GHB EST-MCNC: 100 MG/DL
GLUCOSE SERPL-MCNC: 101 MG/DL (ref 65–100)
GLUCOSE UR-MCNC: NEGATIVE MG/DL
HBA1C MFR BLD: 5.1 % (ref 4–5.6)
HDLC SERPL-MCNC: 74 MG/DL
HDLC SERPL: 3.1 {RATIO} (ref 0–5)
KETONES P FAST UR STRIP-MCNC: NEGATIVE MG/DL
LDLC SERPL CALC-MCNC: 135 MG/DL (ref 0–100)
LIPID PROFILE,FLP: ABNORMAL
PH UR STRIP: 5.5 [PH] (ref 4.6–8)
POTASSIUM SERPL-SCNC: 4.2 MMOL/L (ref 3.5–5.1)
PROT UR QL STRIP: NEGATIVE
SODIUM SERPL-SCNC: 139 MMOL/L (ref 136–145)
SP GR UR STRIP: 1.02 (ref 1–1.03)
T4 FREE SERPL-MCNC: 1 NG/DL (ref 0.8–1.5)
TRIGL SERPL-MCNC: 110 MG/DL (ref ?–150)
TSH SERPL DL<=0.05 MIU/L-ACNC: 0.92 UIU/ML (ref 0.36–3.74)
UA UROBILINOGEN AMB POC: NORMAL (ref 0.2–1)
URINALYSIS CLARITY POC: CLEAR
URINALYSIS COLOR POC: YELLOW
URINE BLOOD POC: NEGATIVE
URINE LEUKOCYTES POC: NORMAL
URINE NITRITES POC: NEGATIVE
VLDLC SERPL CALC-MCNC: 22 MG/DL

## 2020-04-08 RX ORDER — CHOLECALCIFEROL (VITAMIN D3) 50 MCG
CAPSULE ORAL
COMMUNITY

## 2020-04-08 RX ORDER — CEPHALEXIN 500 MG/1
500 CAPSULE ORAL 4 TIMES DAILY
Qty: 20 CAP | Refills: 0 | Status: SHIPPED | OUTPATIENT
Start: 2020-04-08 | End: 2020-04-13

## 2020-04-08 RX ORDER — BISMUTH SUBSALICYLATE 262 MG
1 TABLET,CHEWABLE ORAL DAILY
COMMUNITY

## 2020-04-08 RX ORDER — APREMILAST 30 MG/1
30 TABLET, FILM COATED ORAL 2 TIMES DAILY
COMMUNITY
Start: 2020-02-06 | End: 2020-07-31

## 2020-04-08 RX ORDER — CETIRIZINE HYDROCHLORIDE 10 MG/1
CAPSULE, LIQUID FILLED ORAL
COMMUNITY

## 2020-04-08 NOTE — PROGRESS NOTES
Assessment and Plan   Diagnoses and all orders for this visit:    1. Left ear pain  Possibly due to eustachian tube dysfunction. Recommend over-the-counter nasal steroid    2. Obesity (BMI 30-39.9)  -     LIPID PANEL; Future  -     HEMOGLOBIN A1C WITH EAG; Future  -     T4, FREE; Future  -     TSH 3RD GENERATION; Future  We will get labs and then discuss possible weight loss medications    3. Urinary urgency  -     AMB POC URINALYSIS DIP STICK AUTO W/O MICRO  UA with trace leukocytes. Will be treating for possible cellulitis so antibiotics may cover for possible UTI    4. Cellulitis of other specified site  -     cephALEXin (KEFLEX) 500 mg capsule; Take 1 Cap by mouth four (4) times daily for 5 days. 5. Psoriatic arthritis ([address]  -     METABOLIC PANEL, BASIC; Future    6. Alcohol use  Advised that the CDC recommendation is no more than 7 drinks a week for female    7. Chronic pain of both knees  Followed by Ortho. Planning on left knee surgery      Benefits, risks, possible drug interactions, and side effects of all new medications were reviewed with the patient. Pt verbalized understanding. Return to clinic: Pending labs and whether or not we start medications for obesity; otherwise, 1 year follow-up for physical or earlier as needed  Scribzt message sent.  LDL elevated.  Lifestyle changes    Sergio Centeno MD  [facility]  4/8/2020    No future appointments. History of Present Illness   Chief Complaint   Establish care    Gabriel Collado is a 45 y.o. female     Left ear painongoing for the past 3 weeks. Feels like her hearing is \"off. \"  Reports feeling like she was having some drainage before the pain started. Denies any sinus respiratory symptoms. Skin lesion reports a red skin lesion on her left hip that has been there for a month. Was initially a lot larger with some purulent drainage. Has been using topical medications.   Spot is getting smaller although still present. Urinary urgency reports that she has been getting urinary urgency, frequency around her menstrual cycle. Recently just had her menstrual cycle and still feels like she has some urgency and feels like her urine is cloudy. Alcohol usehas been drinking a little bit more while she has been    Knee pain was a  when she was younger and has meniscus problems. She was supposed to have surgery on her left knee but that was delayed due to coronavirus. Psoriatic arthritisfollowed by Dr. Marisa Madison and currently on [place] patient reports difficulty losing weight despite watching her diet and exercise. Reports that she has been told she might have PCOS given some previous imaging and facial hair growth. Patient potentially interested in starting medications    Review of Systems   Constitutional: Negative for chills and fever. HENT: Positive for ear pain. Negative for hearing loss. Eyes: Negative for blurred vision. Respiratory: Negative for shortness of breath. Cardiovascular: Negative for chest pain. Gastrointestinal: Negative for abdominal pain, blood in stool, constipation, diarrhea, melena, nausea and vomiting. Genitourinary: Positive for urgency. Negative for dysuria and hematuria. Musculoskeletal: Positive for joint pain. Skin: Positive for rash. Neurological: Negative for headaches. Past Medical History     Allergies   Allergen Reactions    Sulfa (Sulfonamide Antibiotics) Other (comments)     As a child. \" Not sure what happened, possibly some breathing problems. \"        Current Outpatient Medications   Medication Sig    Otezla 30 mg tab Take 30 mg by mouth two (2) times a day.  Cetirizine (ZyrTEC) 10 mg cap Take  by mouth.  multivitamin (ONE A DAY) tablet Take 1 Tab by mouth daily.  B.infantis-B.ani-B.long-B.bifi (Probiotic 4X) 10-15 mg TbEC Take  by mouth. No current facility-administered medications for this visit. Patient Active Problem List   Diagnosis Code    Hypoglycemia E16.2    Menorrhagia N92.0    Fibrocystic disease of breast N60.19    Urinary incontinence R32    Psoriatic arthritis ([facility] [facility] L40.50    Asthma J45.909    History of Helicobacter pylori infection Z86.19     Past Surgical History:   Procedure Laterality Date    HX GYN  2014    uterine ablation    HX KNEE ARTHROSCOPY Left 2016    meniscal repair    HX ORTHOPAEDIC Right     right hand, nerve repair    HX TYMPANOSTOMY Bilateral     HX UROLOGICAL  14    Urodynamics      Social History     Tobacco Use    Smoking status: Former Smoker     Packs/day: 0.25     Years: 2.00     Pack years: 0.50     Last attempt to quit: 1999     Years since quittin.9    Smokeless tobacco: Never Used   Substance Use Topics    Alcohol use: Yes     Alcohol/week: 0.0 standard drinks     Comment: 3-4 glasses of wine on the weekend      Family History   Problem Relation Age of Onset   [facility] Arthritis-rheumatoid Mother     Diabetes Mother     SLE Mother     High Cholesterol Father     Coronary Artery Disease Paternal Grandfather     Cancer Maternal Uncle         colon        Physical Exam   Vitals:       Visit Vitals  /81 (BP 1 Location: Left arm, BP Patient Position: Sitting)   Pulse 94   Temp 98.1 °F (36.7 °C) (Oral)   Resp 18   Ht 5' 4\" (1.626 m)   Wt 215 lb 4 oz (97.6 kg)   SpO2 96%   BMI 36.95 kg/m²        Physical Exam  Constitutional:       General: She is not in acute distress. Appearance: She is well-developed. HENT:      Right Ear: Tympanic membrane, ear canal and external ear normal.      Left Ear: Ear canal and external ear normal.      Ears:      Comments: Mild retraction of left TM     Nose: Nose normal.      Mouth/Throat:      Mouth: Mucous membranes are moist.      Pharynx: No posterior oropharyngeal erythema.    Eyes:      Conjunctiva/sclera: Conjunctivae normal.      Pupils: Pupils are equal, round, and reactive to light.   Neck:      Musculoskeletal: Neck supple. Cardiovascular:      Rate and Rhythm: Normal rate and regular rhythm. Pulses: Normal pulses. Heart sounds: No murmur. No friction rub. No gallop. Pulmonary:      Effort: No respiratory distress. Breath sounds: No wheezing or rales. Abdominal:      General: Bowel sounds are normal. There is no distension. Palpations: Abdomen is soft. There is no hepatomegaly, splenomegaly or mass. Tenderness: There is no abdominal tenderness. Hernia: No hernia is present. Skin:     General: Skin is warm. Findings: No rash. Comments: Left flank by 1 cm erythematous patch   Neurological:      Mental Status: She is alert. Psychiatric:         Mood and Affect: Mood normal.         Thought Content:  Thought content normal.         Judgment: Judgment normal.          Voice recognition software is utilized and this note may contain transcription errors

## 2020-05-05 ENCOUNTER — TELEPHONE (OUTPATIENT)
Dept: INTERNAL MEDICINE CLINIC | Age: 45
End: 2020-05-05

## 2020-05-05 NOTE — TELEPHONE ENCOUNTER
----- Message from Nicole Herrera sent at 5/5/2020 11:47 AM EDT -----  Regarding: Dr. Evelia Quintanilla Message/Vendor Calls    Caller's first and last name:Rebecca [facility]      Reason for call:lab results faxed to uematologist      Callback required yes/no and why:yes      Best contact number(s):[phone]      Details to clarify the request:  Please fax over lab results to Dr. Warren Hutchison at [facility]. The phone number is [phone] and the fax number is [phone].     Nicole Herrera

## 2020-05-05 NOTE — TELEPHONE ENCOUNTER
Recent labs have been faxed as requested to. Dr. Oxana Kc at [facility]. fax number is [phone]. Patient notified.

## 2020-07-29 ENCOUNTER — TELEPHONE (OUTPATIENT)
Dept: INTERNAL MEDICINE CLINIC | Age: 45
End: 2020-07-29

## 2020-07-30 NOTE — PROGRESS NOTES
Consent: Jennifer Huerta, who was seen by synchronous (real-time) audio-video technology, and/or her healthcare decision maker, is aware that this patient-initiated, Telehealth encounter on 7/31/2020 is a billable service, with coverage as determined by her insurance carrier. She is aware that she may receive a bill and has provided verbal consent to proceed: Yes. I was in the office while conducting this encounter. This visit was done with doxy. me    Assessment and Plan   Diagnoses and all orders for this visit:    1. Obesity (BMI 30-39.9)  -     phentermine-topiramate (Qsymia) 3.75-23 mg CM24; Take 1 tablet by mouth once a day for 2 weeks then take 2 tablets by mouth once a day for 2 weeks  Indications: weight loss management for an obese person  -     phentermine-topiramate (Qsymia) 7.5-46 mg CM24; Take 1 Tab by mouth daily. Max Daily Amount: 1 Tab. Indications: weight loss management for an obese person  Weight has been staying the same and she has not had luck losing any weight. Was more active before she started having knee issues. Follows a paleo  diet. Tried keto in the past but that did not work. We discussed qsymia versus Victoza. Reports that she was on  qsymia very briefly but came off of it because she started [place]. We will retry qsymia. Advised to let us know if she has any issues    Over 50% of the 15 minutes face to face with Jennifer Huerta consisted of counseling and/or discussing treatment plans in reference to her obesity. We discussed the expected course, resolution and complications of the diagnosis(es) in detail. Medication risks, benefits, costs, interactions, and alternatives were discussed as indicated. I advised her to contact the office if her condition worsens, changes or fails to improve as anticipated. She expressed understanding with the diagnosis(es) and plan.      Return to clinic: 1 month for medication follow-up    Lurdes Randle MD  Internal Medicine [facility]  7/31/2020    No future appointments. Subjective   Chief Complaint   Obesity    Edith Herrera is a 45 y.o. female     Recently had knee surgery and is interested in losing weight to help with her knee issues. Interested in discussing medications. Review of Systems   Constitutional: Negative for chills and fever. Respiratory: Negative for shortness of breath. Cardiovascular: Negative for chest pain. Objective   Vitals:       Patient-Reported Vitals 7/31/2020   Patient-Reported Weight 215lb   Patient-Reported Height 5f4                 Physical Exam  Constitutional:       Appearance: Normal appearance. She is not ill-appearing. HENT:      Head: Normocephalic and atraumatic. Right Ear: External ear normal.      Left Ear: External ear normal.      Mouth/Throat:      Mouth: Mucous membranes are moist.   Eyes:      General:         Right eye: No discharge. Left eye: No discharge. Extraocular Movements: Extraocular movements intact. Conjunctiva/sclera: Conjunctivae normal.   Neck:      Musculoskeletal: Normal range of motion. Pulmonary:      Effort: Pulmonary effort is normal. No respiratory distress. Musculoskeletal:      Comments: Able to hold phone without issue   Skin:     Comments: No obvious facial rashes or abnormalities   Neurological:      Mental Status: She is alert and oriented to person, place, and time. Comments: No obvious focal deficit   Psychiatric:         Mood and Affect: Mood normal.         Thought Content: Thought content normal.         Judgment: Judgment normal.          Voice recognition software is utilized and this note may contain transcription errors     Edith Herrera is a 45 y.o. female being evaluated by a video visit encounter for concerns as above. A caregiver was present when appropriate.  Due to this being a TeleHealth encounter (During [place] [address] public health emergency), evaluation of the following organ systems was limited: Vitals/Constitutional/EENT/Resp/CV/GI//MS/Neuro/Skin/Heme-Lymph-Imm. Pursuant to the emergency declaration under the [address] [place] [address], [facility] waiver authority and the [employer] Act, this Virtual  Visit was conducted, with patient's (and/or legal guardian's) consent, to reduce the patient's risk of exposure to COVID-19 and provide necessary medical care. Services were provided through a video synchronous discussion virtually to substitute for in-person clinic visit.

## 2020-07-31 ENCOUNTER — VIRTUAL VISIT (OUTPATIENT)
Dept: INTERNAL MEDICINE CLINIC | Age: 45
End: 2020-07-31

## 2020-07-31 DIAGNOSIS — E66.9 OBESITY (BMI 30-39.9): Primary | ICD-10-CM

## 2020-07-31 RX ORDER — MELOXICAM 7.5 MG/1
TABLET ORAL
COMMUNITY
Start: 2020-07-22

## 2020-07-31 RX ORDER — PHENTERMINE AND TOPIRAMATE 3.75; 23 MG/1; MG/1
CAPSULE, EXTENDED RELEASE ORAL
Qty: 42 CAP | Refills: 0 | Status: SHIPPED | OUTPATIENT
Start: 2020-07-31 | End: 2020-08-03

## 2020-07-31 RX ORDER — PHENTERMINE AND TOPIRAMATE 7.5; 46 MG/1; MG/1
1 CAPSULE, EXTENDED RELEASE ORAL DAILY
Qty: 30 CAP | Refills: 2 | Status: SHIPPED | OUTPATIENT
Start: 2020-08-31 | End: 2020-08-03

## 2020-08-03 NOTE — PROGRESS NOTES
Addendum   08/03/20   Patient sent a Curse message saying that she remembered that qsymia was potentially causing hair loss. She is interested in starting Victoza instead. Denies a history of MEN2 or family history of thyroid cancer.   Prescription sent to the pharmacy

## 2020-10-09 ENCOUNTER — OFFICE VISIT (OUTPATIENT)
Dept: OBGYN CLINIC | Age: 45
End: 2020-10-09
Payer: COMMERCIAL

## 2020-10-09 VITALS
WEIGHT: 223 LBS | DIASTOLIC BLOOD PRESSURE: 81 MMHG | HEIGHT: 64 IN | SYSTOLIC BLOOD PRESSURE: 117 MMHG | BODY MASS INDEX: 38.07 KG/M2

## 2020-10-09 DIAGNOSIS — N94.6 DYSMENORRHEA: Primary | ICD-10-CM

## 2020-10-09 PROCEDURE — 99213 OFFICE O/P EST LOW 20 MIN: CPT | Performed by: OBSTETRICS & GYNECOLOGY

## 2020-10-09 NOTE — PROGRESS NOTES
Abnormal bleeding note Lindy France is a 39 y.o. female who complains of vaginal bleeding problems. Her current method of family planning is {contraception:611101}. She developed this problem approximately {Number:40668} {Time:51783} ago. She has had vaginal bleeding which she describes as {Vag Bleed Desc:86174} lasting up to {Number:07212} {Time; days to years:01346}. Pad or tampon count: changes every {numbers; 0-10:08715} hours. Associated symptoms include {sx:44233}. Alleviating factors: none Aggravating factors: none The patient {sys sexually active:58618} sexually active. Last Pap smear:{sys last pap:54107}. Her relevant past medical history:  
Past Medical History:  
Diagnosis Date  Asthma   
 as a child  Cystic fibrosis carrier  Hypoglycemia  Psoriatic arthritis ([facility] [address]  Unspecified adverse effect of anesthesia \"anesthesia difficult to kick in.\"  
  
 
Past Surgical History:  
Procedure Laterality Date  HX GYN  2014  
 uterine ablation  HX KNEE ARTHROSCOPY Left 2016  
 meniscal repair  HX ORTHOPAEDIC Right   
 right hand, nerve repair  HX TYMPANOSTOMY Bilateral   
 HX UROLOGICAL  14 [facility] Social History Occupational History  Not on file Tobacco Use  Smoking status: Former Smoker Packs/day: 0.25 Years: 2.00 Pack years: 0.50 Last attempt to quit: 1999 Years since quittin.4  Smokeless tobacco: Never Used Substance and Sexual Activity  Alcohol use: Yes Alcohol/week: 0.0 standard drinks Comment: 3-4 glasses of wine on the weekend  Drug use: No  
 Sexual activity: Yes  
  Partners: Male Birth control/protection: Surgical  
  Comment: vasectomy Family History Problem Relation Age of Onset  Arthritis-rheumatoid Mother  Diabetes Mother  SLE Mother  High Cholesterol Father  Coronary Artery Disease Paternal Grandfather  Cancer Maternal Uncle   
     colon Allergies Allergen Reactions  Sulfa (Sulfonamide Antibiotics) Other (comments) As a child. \" Not sure what happened, possibly some breathing problems. \" Prior to Admission medications Medication Sig Start Date End Date Taking? Authorizing Provider  
meloxicam (MOBIC) 7.5 mg tablet TK 1 T PO BID WF PRF PAIN 7/22/20   Provider, Historical  
Cetirizine (ZyrTEC) 10 mg cap Take  by mouth. Provider, Historical  
multivitamin (ONE A DAY) tablet Take 1 Tab by mouth daily. Provider, Historical  
B.infantis-B.ani-B.long-B.bifi (Probiotic 4X) 10-15 mg TbEC Take  by mouth. Provider, Historical  
  
 
Review of Systems - History obtained from the patient Constitutional: negative for weight loss, fever, night sweats HEENT: negative for hearing loss, earache, congestion, snoring, sorethroat CV: negative for chest pain, palpitations, edema Resp: negative for cough, shortness of breath, wheezing Breast: negative for breast lumps, nipple discharge, galactorrhea GI: negative for change in bowel habits, abdominal pain, black or bloody stools : negative for frequency, dysuria, hematuria MSK: negative for back pain, joint pain, muscle pain Skin: negative for itching, rash, hives Neuro: negative for dizziness, headache, confusion, weakness Psych: negative for anxiety, depression, change in mood Heme/lymph: negative for bleeding, bruising, pallor Objective: There were no vitals taken for this visit. PHYSICAL EXAMINATION Constitutional 
· Appearance: well-nourished, well developed, alert, in no acute distress HENT 
· Head and Face: appears normal 
 
Neck · Inspection/Palpation: normal appearance, no masses or tenderness · Lymph Nodes: no lymphadenopathy present · Thyroid: gland size normal, nontender, no nodules or masses present on palpation · Breasts · Inspection of Breasts: breasts symmetrical, no skin changes, no discharge present, nipple appearance normal, no skin retraction present · Palpation of Breasts and Axillae: no masses present on palpation, no breast tenderness · Axillary Lymph Nodes: no lymphadenopathy present Gastrointestinal 
· Abdominal Examination: abdomen non-tender to palpation, normal bowel sounds, no masses present · Liver and spleen: no hepatomegaly present, spleen not palpable · Hernias: no hernias identified Genitourinary · External Genitalia: normal appearance for age, no discharge present, no tenderness present, no inflammatory lesions present, no masses present, no atrophy present · Vagina: normal vaginal vault without central or paravaginal defects, no discharge present, no inflammatory lesions present, no masses present · Bladder: non-tender to palpation · Urethra: appears normal 
· Cervix: normal  
· Uterus: normal size, shape and consistency · Adnexa: no adnexal tenderness present, no adnexal masses present · Perineum: perineum within normal limits, no evidence of trauma, no rashes or skin lesions present · Anus: anus within normal limits, no hemorrhoids present · Inguinal Lymph Nodes: no lymphadenopathy present Skin · General Inspection: no rash, no lesions identified Neurologic/Psychiatric · Mental Status: · Orientation: grossly oriented to person, place and time · Mood and Affect: mood normal, affect appropriate Assessment:  
*** bleeding Plan:  
*** Instructions given to pt. Handouts given to pt.

## 2020-10-09 NOTE — PROGRESS NOTES
Chief Complaint   Menstrual Problem      HPI  Cb Jerez is a 39 y.o. female who presents for the evaluation of symptoms 2-3 days prior to period. No LMP recorded. Patient has had an ablation. She states she has regular periods with scant bleeding due to hx of ablation. She is currently sexually active,  has vasectomy. She c/o vomiting, diarrhea and severe low back pain only on the right side 2-3 days prior to period. She noticed this several months ago, has been keeping track, and for the past 5 months it has been cyclic. She denies suprapubic pain and cramping. Pain starts in her right low back. She does incidentally have a ureteral stent in place on that side. She has hx of normal colonoscopy. Past Medical History:   Diagnosis Date    Asthma     as a child    Cystic fibrosis carrier     Hypoglycemia     Psoriatic arthritis ([facility] [address]     Unspecified adverse effect of anesthesia     \"anesthesia difficult to kick in.\"     Past Surgical History:   Procedure Laterality Date    HX GYN  2014    uterine ablation    HX KNEE ARTHROSCOPY Left 2016    meniscal repair    HX ORTHOPAEDIC Right     right hand, nerve repair    HX TYMPANOSTOMY Bilateral     HX UROLOGICAL  14    [facility]rod[facility]     Social History     Occupational History    Not on file   Tobacco Use    Smoking status: Former Smoker     Packs/day: 0.25     Years: 2.00     Pack years: 0.50     Last attempt to quit: 1999     Years since quittin.4    Smokeless tobacco: Never Used   Substance and Sexual Activity    Alcohol use:  Yes     Alcohol/week: 0.0 standard drinks     Comment: 3-4 glasses of wine on the weekend    Drug use: No    Sexual activity: Yes     Partners: Male     Birth control/protection: Surgical     Comment: vasectomy     Family History   Problem Relation Age of Onset   [place] Arthritis-rheumatoid Mother     Diabetes Mother     SLE Mother     High Cholesterol Father     Coronary Artery Disease Paternal Grandfather     Cancer Maternal Uncle         colon       Allergies   Allergen Reactions    Sulfa (Sulfonamide Antibiotics) Other (comments)     As a child. \" Not sure what happened, possibly some breathing problems. \"     Prior to Admission medications    Medication Sig Start Date End Date Taking? Authorizing Provider   meloxicam (MOBIC) 7.5 mg tablet TK 1 T PO BID WF PRF PAIN 7/22/20  Yes Provider, Historical   Cetirizine (ZyrTEC) 10 mg cap Take  by mouth. Yes Provider, Historical   multivitamin (ONE A DAY) tablet Take 1 Tab by mouth daily. Yes Provider, Historical   B.infantis-B.ani-B.long-B.bifi (Probiotic 4X) 10-15 mg TbEC Take  by mouth.    Yes Provider, Historical        Review of Systems: History obtained from the patient  Constitutional: negative for weight loss, fever, night sweats  HEENT: negative for hearing loss, earache, congestion, snoring, sorethroat  CV: negative for chest pain, palpitations, edema  Resp: negative for cough, shortness of breath, wheezing  Breast: negative for breast lumps, nipple discharge, galactorrhea  GI: negative for change in bowel habits, abdominal pain, black or bloody stools  : negative for frequency, dysuria, hematuria, vaginal discharge  MSK: negative for back pain, joint pain, muscle pain  Skin: negative for itching, rash, hives  Neuro: negative for dizziness, headache, confusion, weakness  Psych: negative for anxiety, depression, change in mood  Heme/lymph: negative for bleeding, bruising, pallor    Objective:  Visit Vitals  /81   Ht 5' 4\" (1.626 m)   Wt 223 lb (101.2 kg)   BMI 38.28 kg/m²       Physical Exam:   PHYSICAL EXAMINATION    Constitutional  · Appearance: well-nourished, well developed, alert, in no acute distress      Gastrointestinal  · Abdominal Examination: abdomen non-tender to palpation, normal bowel sounds, no masses present  · Liver and spleen: no hepatomegaly present, spleen not palpable  · Hernias: no hernias identified    Genitourinary  · External Genitalia: normal appearance for age, no discharge present, no tenderness present, no inflammatory lesions present, no masses present, no atrophy present  · Vagina: normal vaginal vault without central or paravaginal defects, no discharge present, no inflammatory lesions present, no masses present  · Bladder: non-tender to palpation  · Urethra: appears normal  · Cervix: normal   · Uterus: normal size, shape and consistency  · Adnexa: no adnexal tenderness present, no adnexal masses present  · Perineum: perineum within normal limits, no evidence of trauma, no rashes or skin lesions present  · Anus: anus within normal limits, no hemorrhoids present  · Inguinal Lymph Nodes: no lymphadenopathy present    Skin  · General Inspection: no rash, no lesions identified    Neurologic/Psychiatric  · Mental Status:  · Orientation: grossly oriented to person, place and time  · Mood and Affect: mood normal, affect appropriate    Assessment:   Pelvic pain associated with menses    Plan:   Needs US  Discussed trying a 3 month continuous pill to avoid menses and see if she still has pain - at least we could tell if related. Wants US first      RTO prn if symptoms persist or worsen. Instructions given to pt. Handouts given to pt.

## 2020-10-27 ENCOUNTER — OFFICE VISIT (OUTPATIENT)
Dept: OBGYN CLINIC | Age: 45
End: 2020-10-27
Payer: COMMERCIAL

## 2020-10-27 DIAGNOSIS — R10.2 PELVIC PAIN IN FEMALE: Primary | ICD-10-CM

## 2020-10-27 PROCEDURE — 76830 TRANSVAGINAL US NON-OB: CPT | Performed by: OBSTETRICS & GYNECOLOGY

## 2020-10-27 PROCEDURE — 99213 OFFICE O/P EST LOW 20 MIN: CPT | Performed by: OBSTETRICS & GYNECOLOGY

## 2020-12-09 ENCOUNTER — OFFICE VISIT (OUTPATIENT)
Dept: OBGYN CLINIC | Age: 45
End: 2020-12-09
Payer: COMMERCIAL

## 2020-12-09 VITALS
WEIGHT: 229 LBS | SYSTOLIC BLOOD PRESSURE: 116 MMHG | DIASTOLIC BLOOD PRESSURE: 83 MMHG | BODY MASS INDEX: 39.09 KG/M2 | HEIGHT: 64 IN

## 2020-12-09 DIAGNOSIS — Z01.419 ENCOUNTER FOR GYNECOLOGICAL EXAMINATION (GENERAL) (ROUTINE) WITHOUT ABNORMAL FINDINGS: Primary | ICD-10-CM

## 2020-12-09 PROCEDURE — 99396 PREV VISIT EST AGE 40-64: CPT | Performed by: OBSTETRICS & GYNECOLOGY

## 2020-12-09 NOTE — PROGRESS NOTES
Terence Russo is a ,  39 y.o. female [facility]   who presents for her annual checkup. She is having no significant problems. Still has cyclic nausea - declines OCP    Menstrual status:    Her periods are absent. Hx of ablation. Periods are scant    She denies dysmenorrhea. She reports no premenstrual symptoms. The patient is not using HRT. Contraception:    The current method of family planning is vasectomy. Sexual history:    She  reports being sexually active and has had partner(s) who are Male. She reports using the following method of birth control/protection: Surgical.    Medical conditions:    Since her last annual GYN exam about one year ago, she has had the following changes in her health history: none. Pap and Mammogram History:    Her most recent Pap smear was 2017 normal/HPV neg    The patient had her mammogram today in our office. Breast Cancer History/Substance Abuse:    She has no family history of breast cancer. Osteoporosis History:    Family history does not include a first or second degree relative with osteopenia or osteoporosis. Past Medical History:   Diagnosis Date    Asthma     as a child    Cystic fibrosis carrier     Hypoglycemia     Psoriatic arthritis ([facility] [address]     Unspecified adverse effect of anesthesia     \"anesthesia difficult to kick in.\"     Past Surgical History:   Procedure Laterality Date    HX GYN  2014    uterine ablation    HX KNEE ARTHROSCOPY Left 2016    meniscal repair    HX ORTHOPAEDIC Right     right hand, nerve repair    HX TYMPANOSTOMY Bilateral     HX UROLOGICAL  14    [facility]rodynamics     Current Outpatient Medications   Medication Sig Dispense Refill    meloxicam (MOBIC) 7.5 mg tablet TK 1 T PO BID WF PRF PAIN      Cetirizine (ZyrTEC) 10 mg cap Take  by mouth.  multivitamin (ONE A DAY) tablet Take 1 Tab by mouth daily.       B.infantis-B.ani-B.long-B.bifi (Probiotic 4X) 10-15 mg TbEC Take  by mouth. Allergies: Sulfa (sulfonamide antibiotics)   Social History     Socioeconomic History    Marital status:      Spouse name: Not on file    Number of children: Not on file    Years of education: Not on file    Highest education level: Not on file   Occupational History    Not on file   Social Needs    Financial resource strain: Not on file    Food insecurity     Worry: Not on file     Inability: Not on file    Transportation needs     Medical: Not on file     Non-medical: Not on file   Tobacco Use    Smoking status: Former Smoker     Packs/day: 0.25     Years: 2.00     Pack years: 0.50     Last attempt to quit: 1999     Years since quittin.5    Smokeless tobacco: Never Used   Substance and Sexual Activity    Alcohol use: Yes     Alcohol/week: 0.0 standard drinks     Comment: 3-4 glasses of wine on the weekend    Drug use: No    Sexual activity: Yes     Partners: Male     Birth control/protection: Surgical     Comment: vasectomy   Lifestyle    Physical activity     Days per week: Not on file     Minutes per session: Not on file    Stress: Not on file   Relationships    Social connections     Talks on phone: Not on file     Gets together: Not on file     Attends Sikh service: Not on file     Active member of club or organization: Not on file     Attends meetings of clubs or organizations: Not on file     Relationship status: Not on file    Intimate partner violence     Fear of current or ex partner: Not on file     Emotionally abused: Not on file     Physically abused: Not on file     Forced sexual activity: Not on file   Other Topics Concern    Not on file   Social History Narrative    Not on file     Tobacco History:  reports that she quit smoking about 21 years ago. She has a 0.50 pack-year smoking history. She has never used smokeless tobacco.  Alcohol Abuse:  reports current alcohol use. Drug Abuse:  reports no history of drug use.   Patient Active Problem List   Diagnosis Code    Hypoglycemia E16.2    Menorrhagia N92.0    Fibrocystic disease of breast N60.19    Urinary incontinence R32    Psoriatic arthritis ([facility] L40.50    Asthma J45.909    History of Helicobacter pylori infection Z86.19    Severe obesity ([facility] E66.01         Review of Systems - History obtained from the patient  Constitutional: negative for weight loss, fever, night sweats  HEENT: negative for hearing loss, earache, congestion, snoring, sorethroat  CV: negative for chest pain, palpitations, edema  Resp: negative for cough, shortness of breath, wheezing  GI: negative for change in bowel habits, abdominal pain, black or bloody stools  : negative for frequency, dysuria, hematuria, vaginal discharge  MSK: negative for back pain, joint pain, muscle pain  Breast: negative for breast lumps, nipple discharge, galactorrhea  Skin :negative for itching, rash, hives  Neuro: negative for dizziness, headache, confusion, weakness  Psych: negative for anxiety, depression, change in mood  Heme/lymph: negative for bleeding, bruising, pallor    Physical Exam    Visit Vitals  /83   Ht 5' 4\" (1.626 m)   Wt 229 lb (103.9 kg)   BMI 39.31 kg/m²     Constitutional  · Appearance: well-nourished, well developed, alert, in no acute distress    HENT  · Head and Face: appears normal    Neck  · Inspection/Palpation: normal appearance, no masses or tenderness  · Lymph Nodes: no lymphadenopathy present  · Thyroid: gland size normal, nontender, no nodules or masses present on palpation    Chest  · Respiratory Effort: breathing normal  · Auscultation: normal breath sounds    Cardiovascular  · Heart:  · Auscultation: regular rate and rhythm without murmur    Breasts  · Inspection of Breasts: breasts symmetrical, no skin changes, no discharge present, nipple appearance normal, no skin retraction present  · Palpation of Breasts and Axillae: no masses present on palpation, no breast tenderness  · Axillary Lymph Nodes: no lymphadenopathy present    Gastrointestinal  · Abdominal Examination: abdomen non-tender to palpation, normal bowel sounds, no masses present  · Liver and spleen: no hepatomegaly present, spleen not palpable  · Hernias: no hernias identified    Skin  · General Inspection: no rash, no lesions identified    Neurologic/Psychiatric  · Mental Status:  · Orientation: grossly oriented to person, place and time  · Mood and Affect: mood normal, affect appropriate    Genitourinary  · External Genitalia: normal appearance for age, no discharge present, no tenderness present, no inflammatory lesions present, no masses present, no atrophy present  · Vagina: normal vaginal vault without central or paravaginal defects, no discharge present, no inflammatory lesions present, no masses present  · Bladder: non-tender to palpation  · Urethra: appears normal  · Cervix: normal   · Uterus: normal size, shape and consistency  · Adnexa: no adnexal tenderness present, no adnexal masses present  · Perineum: perineum within normal limits, no evidence of trauma, no rashes or skin lesions present  · Anus: anus within normal limits, no hemorrhoids present  · Inguinal Lymph Nodes: no lymphadenopathy present    Assessment:  Routine gynecologic examination  Her current medical status is satisfactory with no evidence of significant gynecologic issues.     Plan:  Counseled re: diet, exercise, healthy lifestyle  Return for yearly wellness visits  Rec annual mammogram

## 2020-12-09 NOTE — PATIENT INSTRUCTIONS
Well Visit, Ages 25 to 48: Care Instructions Your Care Instructions Physical exams can help you stay healthy. Your doctor has checked your overall health and may have suggested ways to take good care of yourself. He or she also may have recommended tests. At home, you can help prevent illness with healthy eating, regular exercise, and other steps. Follow-up care is a key part of your treatment and safety. Be sure to make and go to all appointments, and call your doctor if you are having problems. It's also a good idea to know your test results and keep a list of the medicines you take. How can you care for yourself at home? · Reach and stay at a healthy weight. This will lower your risk for many problems, such as obesity, diabetes, heart disease, and high blood pressure. · Get at least 30 minutes of physical activity on most days of the week. Walking is a good choice. You also may want to do other activities, such as running, swimming, cycling, or playing tennis or team sports. Discuss any changes in your exercise program with your doctor. · Do not smoke or allow others to smoke around you. If you need help quitting, talk to your doctor about stop-smoking programs and medicines. These can increase your chances of quitting for good. · Talk to your doctor about whether you have any risk factors for sexually transmitted infections (STIs). Having one sex partner (who does not have STIs and does not have sex with anyone else) is a good way to avoid these infections. · Use birth control if you do not want to have children at this time. Talk with your doctor about the choices available and what might be best for you. · Protect your skin from too much sun. When you're outdoors from 10 a.m. to 4 p.m., stay in the shade or cover up with clothing and a hat with a wide brim. Wear sunglasses that block UV rays. Even when it's cloudy, put broad-spectrum sunscreen (SPF 30 or higher) on any exposed skin. · See a dentist one or two times a year for checkups and to have your teeth cleaned. · Wear a seat belt in the car. Follow your doctor's advice about when to have certain tests. These tests can spot problems early. For everyone · Cholesterol. Have the fat (cholesterol) in your blood tested after age 21. Your doctor will tell you how often to have this done based on your age, family history, or other things that can increase your risk for heart disease. · Blood pressure. Have your blood pressure checked during a routine doctor visit. Your doctor will tell you how often to check your blood pressure based on your age, your blood pressure results, and other factors. · Vision. Talk with your doctor about how often to have a glaucoma test. 
· Diabetes. Ask your doctor whether you should have tests for diabetes. · Colon cancer. Your risk for colorectal cancer gets higher as you get older. Some experts say that adults should start regular screening at age 48 and stop at age 76. Others say to start before age 48 or continue after age 76. Talk with your doctor about your risk and when to start and stop screening. For women · Breast exam and mammogram. Talk to your doctor about when you should have a clinical breast exam and a mammogram. Medical experts differ on whether and how often women under 50 should have these tests. Your doctor can help you decide what is right for you. · Cervical cancer screening test and pelvic exam. Begin with a Pap test at age 24. The test often is part of a pelvic exam. Starting at age 27, you may choose to have a Pap test, an HPV test, or both tests at the same time (called co-testing). Talk with your doctor about how often to have testing. · Tests for sexually transmitted infections (STIs). Ask whether you should have tests for STIs. You may be at risk if you have sex with more than one person, especially if your partners do not wear condoms. For men · Tests for sexually transmitted infections (STIs). Ask whether you should have tests for STIs. You may be at risk if you have sex with more than one person, especially if you do not wear a condom. · Testicular cancer exam. Ask your doctor whether you should check your testicles regularly. · Prostate exam. Talk to your doctor about whether you should have a blood test (called a PSA test) for prostate cancer. Experts differ on whether and when men should have this test. Some experts suggest it if you are older than 39 and are -American or have a father or brother who got prostate cancer when he was younger than 72. When should you call for help? Watch closely for changes in your health, and be sure to contact your doctor if you have any problems or symptoms that concern you. Where can you learn more? Go to http://www.aaron.com/ Enter P072 in the search box to learn more about \"Well Visit, Ages 25 to 48: Care Instructions. \" Current as of: May 27, 2020               Content Version: 12.6 © 2006-2020 [employer]. Care instructions adapted under license by [employer] (which disclaims liability or warranty for this information). If you have questions about a medical condition or this instruction, always ask your healthcare professional. [address] any warranty or liability for your use of this information.

## 2021-09-17 ENCOUNTER — OFFICE VISIT (OUTPATIENT)
Dept: OBGYN CLINIC | Age: 46
End: 2021-09-17
Payer: COMMERCIAL

## 2021-09-17 VITALS — DIASTOLIC BLOOD PRESSURE: 82 MMHG | BODY MASS INDEX: 37.28 KG/M2 | WEIGHT: 217.2 LBS | SYSTOLIC BLOOD PRESSURE: 122 MMHG

## 2021-09-17 DIAGNOSIS — N64.4 BREAST PAIN: Primary | ICD-10-CM

## 2021-09-17 PROCEDURE — 99212 OFFICE O/P EST SF 10 MIN: CPT | Performed by: OBSTETRICS & GYNECOLOGY

## 2021-09-17 NOTE — PROGRESS NOTES
Breast Pain Evaluation    Ofelia Sainz is a ,  39 y.o. female WHITE/NON- No LMP recorded. Patient has had an ablation. .    She presents with breast pain located in the left breast at the top and under the arm    She noticed it 1 months ago after getting 3rd COVID vaccine. The pain has not changed in intensity. The patient has not noticed changes in the area of the pain: No dimpling, nipple retraction or discharge. No masses or nodes. .    The patient notes the following associated symptoms: none    She notes that the following factors improve her symptoms: none    She notes that the following factors aggravate her symptoms:none    She has had any diagnostic studies for this problem since she noticed it. With regards to breast problems her medical history is significant for the following: none    There is not a family history of breast cancer in a first and second degree relative. Past Medical History:   Diagnosis Date    Asthma     as a child    Cystic fibrosis carrier     Hypoglycemia     P[facility] ([facility] [address]     Unspecified adverse effect of anesthesia     \"anesthesia difficult to kick in.\"     Past Surgical History:   Procedure Laterality Date    HX GYN  2014    uterine ablation    HX KNEE ARTHROSCOPY Left 2016    meniscal repair    HX ORTHOPAEDIC Right     right hand, nerve repair    HX TYMPANOSTOMY Bilateral     HX UROLOGICAL  14    Urodynamics     Social History     Occupational History    Not on file   Tobacco Use    Smoking status: Former Smoker     Packs/day: 0.25     Years: 2.00     Pack years: 0.50     Quit date: 1999     Years since quittin.3    Smokeless tobacco: Never Used   Substance and Sexual Activity    Alcohol use:  Yes     Alcohol/week: 0.0 standard drinks     Comment: 3-4 glasses of wine on the weekend    Drug use: No    Sexual activity: Yes     Partners: Male     Birth control/protection: Surgical     Comment: vasectomy Family History   Problem Relation Age of Onset   [facility] Arthritis-rheumatoid Mother     Diabetes Mother     SLE Mother     High Cholesterol Father     Coronary Artery Disease Paternal Grandfather     Cancer Maternal Uncle         colon       Allergies   Allergen Reactions    Sulfa (Sulfonamide Antibiotics) Other (comments)     As a child. \" Not sure what happened, possibly some breathing problems. \"     Prior to Admission medications    Medication Sig Start Date End Date Taking? Authorizing Provider   Cetirizine (ZyrTEC) 10 mg cap Take  by mouth. Yes Provider, Historical   multivitamin (ONE A DAY) tablet Take 1 Tab by mouth daily. Yes Provider, Historical   B.infantis-B.ani-B.long-B.bifi (Probiotic 4X) 10-15 mg TbEC Take  by mouth.    Yes Provider, Historical   meloxicam (MOBIC) 7.5 mg tablet TK 1 T PO BID WF PRF PAIN  Patient not taking: Reported on 9/17/2021 7/22/20   Provider, Historical        Review of Systems: History obtained from the patient  Constitutional: negative for weight loss, fever, night sweats  HEENT: negative for hearing loss, earache, congestion, snoring, sorethroat  CV: negative for chest pain, palpitations, edema  Resp: negative for cough, shortness of breath, wheezing  Breast: see above  GI: negative for change in bowel habits, abdominal pain, black or bloody stools  : negative for frequency, dysuria, hematuria, vaginal discharge  MSK: negative for back pain, joint pain, muscle pain  Skin: negative for itching, rash, hives  Neuro: negative for dizziness, headache, confusion, weakness  Psych: negative for anxiety, depression, change in mood  Heme/lymph: negative for bleeding, bruising, pallor    Objective:  Visit Vitals  /82   Wt 217 lb 3.2 oz (98.5 kg)   BMI 37.28 kg/m²     Exam:  Constitutional  · Appearance: well-nourished, well developed, alert, in no acute distress    HENT  · Head and Face: appears normal    Neck  · Inspection/Palpation: normal appearance, no masses or tenderness  · Lymph Nodes: no lymphadenopathy present  · Thyroid: gland size normal, nontender, no nodules or masses present on palpation    Breasts  · Inspection of Breasts: breasts symmetrical, no skin changes, no discharge present, nipple appearance normal, no skin retraction present  · Palpation of Breasts and Axillae: no masses present on palpation, no breast tenderness  · Axillary Lymph Nodes: no lymphadenopathy present    Assessment:  Breast pain  No masses on exam  Recent covid vaccine    Plan:  Observe  mammo is due in 3 months - earlier if any changes

## 2021-11-15 ENCOUNTER — OFFICE VISIT (OUTPATIENT)
Dept: ORTHOPEDIC SURGERY | Age: 46
End: 2021-11-15
Payer: COMMERCIAL

## 2021-11-15 DIAGNOSIS — Z98.890 STATUS POST ARTHROSCOPY OF RIGHT KNEE: Primary | ICD-10-CM

## 2021-11-15 PROCEDURE — 99215 OFFICE O/P EST HI 40 MIN: CPT | Performed by: ORTHOPAEDIC SURGERY

## 2021-11-15 NOTE — PROGRESS NOTES
Ashley Garcia (: 1975) is a 42 y.o. female, patient, here for evaluation of the following chief complaint(s):  Knee Pain (right knee pain)       HPI:    Patient returns the office now status post arthroscopy right knee. She is doing well. She is has had some level of improvement although she states she was able to  walk 8000 steps yesterday. She is noted a little bit of black and blue along the inner aspect of the knee. Allergies   Allergen Reactions    Sulfa (Sulfonamide Antibiotics) Other (comments)     As a child. \" Not sure what happened, possibly some breathing problems. \"       Current Outpatient Medications   Medication Sig    meloxicam (MOBIC) 7.5 mg tablet TK 1 T PO BID WF PRF PAIN (Patient not taking: Reported on 2021)    Cetirizine (ZyrTEC) 10 mg cap Take  by mouth.  multivitamin (ONE A DAY) tablet Take 1 Tab by mouth daily.  B.infantis-B.ani-B.long-B.bifi (Probiotic 4X) 10-15 mg TbEC Take  by mouth. No current facility-administered medications for this visit.        Past Medical History:   Diagnosis Date    Asthma     as a child    Cystic fibrosis carrier     Hypoglycemia     P[facility]iatic arthritis ([facility] [address]     Unspecified adverse effect of anesthesia     \"anesthesia difficult to kick in.\"        Past Surgical History:   Procedure Laterality Date    HX GYN  2014    uterine ablation    HX KNEE ARTHROSCOPY Left 2016    meniscal repair    HX ORTHOPAEDIC Right     right hand, nerve repair    HX TYMPANOSTOMY Bilateral     HX UROLOGICAL  14    Urodynamics       Family History   Problem Relation Age of Onset   [facility] Arthritis-rheumatoid Mother     Diabetes Mother     SLE Mother     High Cholesterol Father     Coronary Art Dis Paternal Grandfather     Cancer Maternal Uncle         colon        Social History     Socioeconomic History    Marital status:      Spouse name: Not on file    Number of children: Not on file    Years of education: Not on file  Highest education level: Not on file   Occupational History    Not on file   Tobacco Use    Smoking status: Former Smoker     Packs/day: 0.25     Years: 2.00     Pack years: 0.50     Quit date: 1999     Years since quittin.5    Smokeless tobacco: Never Used   Substance and Sexual Activity    Alcohol use: Yes     Alcohol/week: 0.0 standard drinks     Comment: 3-4 glasses of wine on the weekend    Drug use: No    Sexual activity: Yes     Partners: Male     Birth control/protection: Surgical     Comment: vasectomy   Other Topics Concern    Not on file   Social History Narrative    Not on file     Social Determinants of Health     Financial Resource Strain:     Difficulty of Paying Living Expenses: Not on file   Food Insecurity:     Worried About Running Out of Food in the Last Year: Not on file    Misti of Food in the Last Year: Not on file   Transportation Needs:     Lack of Transportation (Medical): Not on file    Lack of Transportation (Non-Medical): Not on file   Physical Activity:     Days of Exercise per Week: Not on file    Minutes of Exercise per Session: Not on file   Stress:     Feeling of Stress : Not on file   Social Connections:     Frequency of Communication with Friends and Family: Not on file    Frequency of Social Gatherings with Friends and Family: Not on file    Attends Hinduism Services: Not on file    Active Member of [address] [place] [address] or Organizations: Not on file    Attends Club or Organization Meetings: Not on file    Marital Status: Not on file   Intimate Partner Violence:     Fear of Current or Ex-Partner: Not on file    Emotionally Abused: Not on file    Physically Abused: Not on file    Sexually Abused: Not on file   Housing Stability:     Unable to Pay for Housing in the Last Year: Not on file    Number of [place] in the Last Year: Not on file    Unstable Housing in the Last Year: Not on file       Review of Systems    Vitals:   There were no vitals taken for this visit. There is no height or weight on file to calculate BMI. Ortho Exam     Right knee: Mild ecchymosis noted along the inner aspect of the knee. Minimal effusion. Portal sites are healing well. She has full range of motion of her knee joint. Her calf is soft and supple. Neurovascular examination is intact. ASSESSMENT/PLAN:    Patient is doing very well. I would recommend physical therapy to advance her range of motion. I have also gone over management of her portal sites. Patient is to return to the office in 4 weeks.         Clem Olivia MD

## 2021-12-13 ENCOUNTER — OFFICE VISIT (OUTPATIENT)
Dept: ORTHOPEDIC SURGERY | Age: 46
End: 2021-12-13
Payer: COMMERCIAL

## 2021-12-13 DIAGNOSIS — Z98.890 STATUS POST ARTHROSCOPY OF RIGHT KNEE: Primary | ICD-10-CM

## 2021-12-13 PROCEDURE — 99024 POSTOP FOLLOW-UP VISIT: CPT | Performed by: ORTHOPAEDIC SURGERY

## 2021-12-13 NOTE — PROGRESS NOTES
Marika Conner (: 1975) is a 42 y.o. female, patient, here for evaluation of the following chief complaint(s):  Knee Pain (knee post op)       HPI:    Patient returns to the office now status post arthroscopy. Patient is doing well has minimal to no complaints of pain. She has progressed through physical therapy. Allergies   Allergen Reactions    Sulfa (Sulfonamide Antibiotics) Other (comments)     As a child. \" Not sure what happened, possibly some breathing problems. \"       Current Outpatient Medications   Medication Sig    meloxicam (MOBIC) 7.5 mg tablet TK 1 T PO BID WF PRF PAIN (Patient not taking: Reported on 2021)    Cetirizine (ZyrTEC) 10 mg cap Take  by mouth.  multivitamin (ONE A DAY) tablet Take 1 Tab by mouth daily.  B.infantis-B.ani-B.long-B.bifi (Probiotic 4X) 10-15 mg TbEC Take  by mouth. No current facility-administered medications for this visit.        Past Medical History:   Diagnosis Date    Asthma     as a child    Cystic fibrosis carrier     Hypoglycemia     Psoriatic arthritis ([facility] [address]     Unspecified adverse effect of anesthesia     \"anesthesia difficult to kick in.\"        Past Surgical History:   Procedure Laterality Date    HX GYN  2014    uterine ablation    HX KNEE ARTHROSCOPY Left 2016    meniscal repair    HX ORTHOPAEDIC Right     right hand, nerve repair    HX TYMPANOSTOMY Bilateral     HX UROLOGICAL  14    Urodynamics       Family History   Problem Relation Age of Onset   [facility] Arthritis-rheumatoid Mother     Diabetes Mother     SLE Mother     High Cholesterol Father     Coronary Art Dis Paternal Grandfather     Cancer Maternal Uncle         colon        Social History     Socioeconomic History    Marital status:      Spouse name: Not on file    Number of children: Not on file    Years of education: Not on file    Highest education level: Not on file   Occupational History    Not on file   Tobacco Use    Smoking status: Former Smoker     Packs/day: 0.25     Years: 2.00     Pack years: 0.50     Quit date: 1999     Years since quittin.6    Smokeless tobacco: Never Used   Substance and Sexual Activity    Alcohol use: Yes     Alcohol/week: 0.0 standard drinks     Comment: 3-4 glasses of wine on the weekend    Drug use: No    Sexual activity: Yes     Partners: Male     Birth control/protection: Surgical     Comment: vasectomy   Other Topics Concern    Not on file   Social History Narrative    Not on file     Social Determinants of Health     Financial Resource Strain:     Difficulty of Paying Living Expenses: Not on file   Food Insecurity:     Worried About Running Out of Food in the Last Year: Not on file    Misti of Food in the Last Year: Not on file   Transportation Needs:     Lack of Transportation (Medical): Not on file    Lack of Transportation (Non-Medical): Not on file   Physical Activity:     Days of Exercise per Week: Not on file    Minutes of Exercise per Session: Not on file   Stress:     Feeling of Stress : Not on file   Social Connections:     Frequency of Communication with Friends and Family: Not on file    Frequency of Social Gatherings with Friends and Family: Not on file    Attends Yarsani Services: Not on file    Active Member of [address] [place] [address] [employer] or Organizations: Not on file    Attends Club or Organization Meetings: Not on file    Marital Status: Not on file   Intimate Partner Violence:     Fear of Current or Ex-Partner: Not on file    Emotionally Abused: Not on file    Physically Abused: Not on file    Sexually Abused: Not on file   Housing Stability:     Unable to Pay for Housing in the Last Year: Not on file    Number of [place] in the Last Year: Not on file    Unstable Housing in the Last Year: Not on file       Review of Systems   Musculoskeletal:        Knee post op         Vitals: There were no vitals taken for this visit.    There is no height or weight on file to calculate BMI. Ortho Exam     Right knee: Portal sites of healed. She has no effusion. She has full range of motion of the knee joint. She has good quadricep tone and strength. Neurovascular examination is intact. ASSESSMENT/PLAN:    Patient is doing very well. I think it is reasonable and appropriate to advance out of structured physical therapy and continue with a physician directed home exercise program.  She is to return to the office as needed.         Bruce Quach MD

## 2022-03-20 PROBLEM — E66.01 SEVERE OBESITY (HCC): Status: ACTIVE | Noted: 2020-04-08

## 2023-01-30 ENCOUNTER — APPOINTMENT (OUTPATIENT)
Dept: CT IMAGING | Age: 48
End: 2023-01-30
Attending: STUDENT IN AN ORGANIZED HEALTH CARE EDUCATION/TRAINING PROGRAM
Payer: COMMERCIAL

## 2023-01-30 ENCOUNTER — HOSPITAL ENCOUNTER (EMERGENCY)
Age: 48
Discharge: HOME | End: 2023-01-30
Attending: EMERGENCY MEDICINE
Payer: COMMERCIAL

## 2023-01-30 VITALS
HEART RATE: 113 BPM | TEMPERATURE: 98 F | DIASTOLIC BLOOD PRESSURE: 90 MMHG | SYSTOLIC BLOOD PRESSURE: 141 MMHG | RESPIRATION RATE: 16 BRPM | OXYGEN SATURATION: 97 % | BODY MASS INDEX: 37.56 KG/M2 | WEIGHT: 220 LBS | HEIGHT: 64 IN

## 2023-01-30 DIAGNOSIS — R10.9 RIGHT FLANK PAIN: Primary | ICD-10-CM

## 2023-01-30 LAB
ALBUMIN SERPL-MCNC: 3.6 G/DL (ref 3.5–5)
ALBUMIN/GLOB SERPL: 0.9 (ref 1.1–2.2)
ALP SERPL-CCNC: 50 U/L (ref 45–117)
ALT SERPL-CCNC: 21 U/L (ref 12–78)
ANION GAP SERPL CALC-SCNC: 6 MMOL/L (ref 5–15)
APPEARANCE UR: ABNORMAL
AST SERPL-CCNC: 11 U/L (ref 15–37)
BACTERIA URNS QL MICRO: ABNORMAL /HPF
BASOPHILS # BLD: 0.1 K/UL (ref 0–0.1)
BASOPHILS NFR BLD: 1 % (ref 0–1)
BILIRUB SERPL-MCNC: 0.4 MG/DL (ref 0.2–1)
BILIRUB UR QL: NEGATIVE
BUN SERPL-MCNC: 20 MG/DL (ref 6–20)
BUN/CREAT SERPL: 25 (ref 12–20)
CALCIUM SERPL-MCNC: 9.3 MG/DL (ref 8.5–10.1)
CHLORIDE SERPL-SCNC: 106 MMOL/L (ref 97–108)
CO2 SERPL-SCNC: 26 MMOL/L (ref 21–32)
COLOR UR: ABNORMAL
CREAT SERPL-MCNC: 0.79 MG/DL (ref 0.55–1.02)
DIFFERENTIAL METHOD BLD: NORMAL
EOSINOPHIL # BLD: 0.2 K/UL (ref 0–0.4)
EOSINOPHIL NFR BLD: 2 % (ref 0–7)
EPITH CASTS URNS QL MICRO: ABNORMAL /LPF
ERYTHROCYTE [DISTWIDTH] IN BLOOD BY AUTOMATED COUNT: 12.2 % (ref 11.5–14.5)
GLOBULIN SER CALC-MCNC: 4.1 G/DL (ref 2–4)
GLUCOSE SERPL-MCNC: 124 MG/DL (ref 65–100)
GLUCOSE UR STRIP.AUTO-MCNC: NEGATIVE MG/DL
HCT VFR BLD AUTO: 43.5 % (ref 35–47)
HGB BLD-MCNC: 14.5 G/DL (ref 11.5–16)
HGB UR QL STRIP: NEGATIVE
IMM GRANULOCYTES # BLD AUTO: 0 K/UL (ref 0–0.04)
IMM GRANULOCYTES NFR BLD AUTO: 0 % (ref 0–0.5)
KETONES UR QL STRIP.AUTO: ABNORMAL MG/DL
LEUKOCYTE ESTERASE UR QL STRIP.AUTO: ABNORMAL
LIPASE SERPL-CCNC: 87 U/L (ref 73–393)
LYMPHOCYTES # BLD: 2.1 K/UL (ref 0.8–3.5)
LYMPHOCYTES NFR BLD: 23 % (ref 12–49)
MCH RBC QN AUTO: 32.2 PG (ref 26–34)
MCHC RBC AUTO-ENTMCNC: 33.3 G/DL (ref 30–36.5)
MCV RBC AUTO: 96.7 FL (ref 80–99)
MONOCYTES # BLD: 0.8 K/UL (ref 0–1)
MONOCYTES NFR BLD: 9 % (ref 5–13)
NEUTS SEG # BLD: 6.1 K/UL (ref 1.8–8)
NEUTS SEG NFR BLD: 65 % (ref 32–75)
NITRITE UR QL STRIP.AUTO: NEGATIVE
NRBC # BLD: 0 K/UL (ref 0–0.01)
NRBC BLD-RTO: 0 PER 100 WBC
PH UR STRIP: 6 (ref 5–8)
PLATELET # BLD AUTO: 314 K/UL (ref 150–400)
PMV BLD AUTO: 9.3 FL (ref 8.9–12.9)
POTASSIUM SERPL-SCNC: 4.6 MMOL/L (ref 3.5–5.1)
PROT SERPL-MCNC: 7.7 G/DL (ref 6.4–8.2)
PROT UR STRIP-MCNC: ABNORMAL MG/DL
RBC # BLD AUTO: 4.5 M/UL (ref 3.8–5.2)
RBC #/AREA URNS HPF: ABNORMAL /HPF (ref 0–5)
SODIUM SERPL-SCNC: 138 MMOL/L (ref 136–145)
SP GR UR REFRACTOMETRY: 1.02 (ref 1–1.03)
UR CULT HOLD, URHOLD: NORMAL
UROBILINOGEN UR QL STRIP.AUTO: 0.2 EU/DL (ref 0.2–1)
WBC # BLD AUTO: 9.2 K/UL (ref 3.6–11)
WBC URNS QL MICRO: ABNORMAL /HPF (ref 0–4)

## 2023-01-30 PROCEDURE — 36415 COLL VENOUS BLD VENIPUNCTURE: CPT

## 2023-01-30 PROCEDURE — 81001 URINALYSIS AUTO W/SCOPE: CPT

## 2023-01-30 PROCEDURE — 83690 ASSAY OF LIPASE: CPT

## 2023-01-30 PROCEDURE — 80053 COMPREHEN METABOLIC PANEL: CPT

## 2023-01-30 PROCEDURE — 74011000636 HC RX REV CODE- 636: Performed by: EMERGENCY MEDICINE

## 2023-01-30 PROCEDURE — 85025 COMPLETE CBC W/AUTO DIFF WBC: CPT

## 2023-01-30 PROCEDURE — 74177 CT ABD & PELVIS W/CONTRAST: CPT

## 2023-01-30 PROCEDURE — 99285 EMERGENCY DEPT VISIT HI MDM: CPT

## 2023-01-30 RX ADMIN — IOPAMIDOL 100 ML: 755 INJECTION, SOLUTION INTRAVENOUS at 13:48

## 2023-01-30 NOTE — ED PROVIDER NOTES
70-year-old female with history of asthma, psoriatic arthritis presents to ED with 5 to 6 days of right-sided back pain. Patient reports starting mid last week, about 5 to 6 days ago\" she started having some crampy, right-sided back pain. Initially she felt that this was likely a psoriatic arthritis flareup but as the week progressed the pain became worse and became more sharp in nature and she reports \"it does not feel like back pain anymore\". She reports she had googled her symptoms online and seen that this could possibly be appendicitis versus a kidney stone and became concerned and decided to come in. She has been taking Advil and Tylenol for her pain with some relief. Denies any nausea, vomiting, diarrhea, dysuria, urinary frequency, fevers or chills. No inciting injury or trauma. The history is provided by the patient. Back Pain   Pertinent negatives include no chest pain, no fever, no headaches and no dysuria.       Past Medical History:   Diagnosis Date    Asthma     as a child    Cystic fibrosis carrier     Hypoglycemia     Psoriatic arthritis ([facility] [address]     Unspecified adverse effect of anesthesia     \"anesthesia difficult to kick in.\"       Past Surgical History:   Procedure Laterality Date    HX GYN  9/2014    uterine ablation    HX KNEE ARTHROSCOPY Left 05/2016    meniscal repair    HX ORTHOPAEDIC Right 2004    right hand, nerve repair    HX TYMPANOSTOMY Bilateral     HX UROLOGICAL  6/13/14    Urodynamics         Family History:   Problem Relation Age of Onset    Arthritis-rheumatoid Mother     Diabetes Mother     SLE Mother     High Cholesterol Father     Coronary Art Dis Paternal Grandfather     Cancer Maternal Uncle         colon       Social History     Socioeconomic History    Marital status:      Spouse name: Not on file    Number of children: Not on file    Years of education: Not on file    Highest education level: Not on file   Occupational History    Not on file   Tobacco Use Smoking status: Former     Packs/day: 0.25     Years: 2.00     Pack years: 0.50     Types: Cigarettes     Quit date: 1999     Years since quittin.7    Smokeless tobacco: Never   Substance and Sexual Activity    Alcohol use: Yes     Alcohol/week: 0.0 standard drinks     Comment: 3-4 glasses of wine on the weekend    Drug use: No    Sexual activity: Yes     Partners: Male     Birth control/protection: Surgical     Comment: vasectomy   Other Topics Concern    Not on file   Social History Narrative    Not on file     Social Determinants of Health     Financial Resource Strain: Not on file   Food Insecurity: Not on file   Transportation Needs: Not on file   Physical Activity: Not on file   Stress: Not on file   Social Connections: Not on file   Intimate Partner Violence: Not on file   Housing Stability: Not on file         ALLERGIES: Sulfa (sulfonamide antibiotics)    Review of Systems   Constitutional:  Negative for fever. HENT:  Negative for congestion and sinus pressure. Respiratory:  Negative for shortness of breath. Cardiovascular:  Negative for chest pain. Gastrointestinal:  Negative for nausea and vomiting. Genitourinary:  Negative for dysuria. Musculoskeletal:  Positive for back pain. Negative for myalgias. Neurological:  Negative for dizziness and headaches. Hematological:  Negative for adenopathy. Psychiatric/Behavioral:  The patient is not nervous/anxious. All other systems reviewed and are negative. Vitals:    23 1234   BP: (!) 141/90   Pulse: (!) 113   Resp: 16   Temp: 98 °F (36.7 °C)   SpO2: 97%   Weight: 99.8 kg (220 lb)   Height: 5' 4\" (1.626 m)            Physical Exam  Vitals and nursing note reviewed. Constitutional:       General: She is not in acute distress. Appearance: Normal appearance. She is normal weight. HENT:      Head: Normocephalic and atraumatic. Eyes:      Extraocular Movements: Extraocular movements intact.       Pupils: Pupils are equal, round, and reactive to light. Cardiovascular:      Rate and Rhythm: Normal rate and regular rhythm. Heart sounds: Normal heart sounds. Pulmonary:      Breath sounds: Normal breath sounds. Abdominal:      Palpations: Abdomen is soft. Tenderness: There is no abdominal tenderness. There is right CVA tenderness. Lymphadenopathy:      Cervical: No cervical adenopathy. Skin:     General: Skin is warm and dry. Neurological:      General: No focal deficit present. Mental Status: She is alert and oriented to person, place, and time. Psychiatric:         Mood and Affect: Mood normal.         Behavior: Behavior normal.         Thought Content: Thought content normal.        Medical Decision Making  62-year-old female with history of asthma, psoriatic arthritis presents to ED with 5 to 6 days of right-sided back pain. Vital signs revealed patient afebrile with vital signs stable in triage. Physical exam notable for right CVA tenderness but no abdominal tenderness to palpation. Labs revealed no indication of urinary tract infection and no elevation in white blood cell count. Imaging included CT of abdomen pelvis which revealed subtle urothelial thickening in right renal pelvis suggestive of recently passed nonobstructive calculus but no other acute findings. I patient did not want anything for pain while in ED. No significant socioeconomic barriers to care identified by patient during this visit. Had discussions with supervising physician regarding the care of this patient. Discussed findings and reasoning with patient and family members who verbalized understanding. Patient will also be discharged with instructions for conservative care, follow up and return precautions. This has all been discussed at length with patient and family members. Amount and/or Complexity of Data Reviewed  Labs: ordered. Radiology: ordered. Risk  Prescription drug management. Procedures

## 2023-01-30 NOTE — ED TRIAGE NOTES
Pt arrives for back pain since last Tuesday. States hx of psoriatic arthritis.   Denies NVD or urinary symptoms

## 2023-02-06 ENCOUNTER — TELEPHONE (OUTPATIENT)
Dept: INTERNAL MEDICINE CLINIC | Age: 48
End: 2023-02-06

## 2023-02-06 NOTE — TELEPHONE ENCOUNTER
PSR called patient and informed her that unfortunately we are not able to work her in this week for an appointment. Patient was thankful for the call and states she has been worked in with another provider's office.

## 2023-05-19 ENCOUNTER — TELEPHONE (OUTPATIENT)
Age: 48
End: 2023-05-19

## 2023-10-16 NOTE — PROGRESS NOTES
Lizzie Guillaume is a 57 y.o. female returns for an annual exam     Chief Complaint   Patient presents with    Annual Exam       No LMP recorded. Patient has had an ablation. Her periods are light due to ablation  She does not have dysmenorrhea. Problems: wants to have hormones checked  Birth Control: vasectomy. Last Pap: 8/24/2017 normal/HPV neg  She does not have a history of ELISHA 2, 3 or cervical cancer. Last Mammogram: had her mammogram today in our office.      Last colonoscopy: 15 years ago per patient normal      Examination chaperoned by Montine Kawasaki, MA.

## 2023-10-26 ENCOUNTER — OFFICE VISIT (OUTPATIENT)
Age: 48
End: 2023-10-26
Payer: COMMERCIAL

## 2023-10-26 VITALS — WEIGHT: 224 LBS | SYSTOLIC BLOOD PRESSURE: 134 MMHG | DIASTOLIC BLOOD PRESSURE: 82 MMHG | BODY MASS INDEX: 38.45 KG/M2

## 2023-10-26 DIAGNOSIS — Z01.419 ENCNTR FOR GYN EXAM (GENERAL) (ROUTINE) W/O ABN FINDINGS: Primary | ICD-10-CM

## 2023-10-26 DIAGNOSIS — Z11.51 SCREENING FOR HPV (HUMAN PAPILLOMAVIRUS): ICD-10-CM

## 2023-10-26 PROCEDURE — 99396 PREV VISIT EST AGE 40-64: CPT | Performed by: OBSTETRICS & GYNECOLOGY

## 2023-10-26 NOTE — PROGRESS NOTES
David Leslie is a [id number],  57 y.o. female White (non-) whose LMP was on  who presents for her annual checkup. She is having no problems. Menstrual status:    Her periods are light in flow, regular hx of ablation    She denies dysmenorrhea. Contraception:    The current method of family planning is vasectomy. Sexual history:    She  reports being sexually active and has had partner(s) who are male. She reports using the following method of birth control/protection: Surgical.        Pap and Mammogram History:    Birth Control: vasectomy. Last Pap: 8/24/2017 normal/HPV neg  She does not have a history of ELISHA 2, 3 or cervical cancer. Last Mammogram: had her mammogram today in our office. Last colonoscopy: 15 years ago per patient normal         Breast Cancer History    She has a family history of breast cancer. Family History   Problem Relation Age of Onset    Rheum Arthritis Mother     Diabetes Mother     Lupus Mother     High Cholesterol Father     Coronary Art Dis Paternal Grandfather     Cancer Maternal Uncle         colon       Past Medical History:   Diagnosis Date    Asthma     as a child    Cystic fibrosis carrier     Hypoglycemia     Psoriatic arthritis ([address])     Unspecified adverse effect of anesthesia     \"anesthesia difficult to kick in.\"     Past Surgical History:   Procedure Laterality Date    GYN  9/2014    uterine ablation    KNEE ARTHROSCOPY Left 05/2016    meniscal repair    ORTHOPEDIC SURGERY Right 2004    right hand, nerve repair    TYMPANOSTOMY TUBE PLACEMENT Bilateral     UROLOGICAL SURGERY  6/13/14    Urodynamics     Current Outpatient Medications   Medication Sig Dispense Refill    Cetirizine HCl (ZYRTEC ALLERGY) 10 MG CAPS Take by mouth      meloxicam (MOBIC) 7.5 MG tablet TK 1 T PO BID WF PRF PAIN      ustekinumab (STELARA) 45 MG/0.5ML SOSY prefilled syringe Inject 0.5 mLs into the skin once       No current facility-administered medications for this visit.

## 2023-11-02 LAB
CYTOLOGIST CVX/VAG CYTO: NORMAL
CYTOLOGY CVX/VAG DOC CYTO: NORMAL
CYTOLOGY CVX/VAG DOC THIN PREP: NORMAL
DX ICD CODE: NORMAL
HPV GENOTYPE REFLEX: NORMAL
HPV I/H RISK 4 DNA CVX QL PROBE+SIG AMP: NEGATIVE
Lab: NORMAL
OTHER STN SPEC: NORMAL
STAT OF ADQ CVX/VAG CYTO-IMP: NORMAL

## 2025-02-13 ENCOUNTER — OFFICE VISIT (OUTPATIENT)
Age: 50
End: 2025-02-13
Payer: COMMERCIAL

## 2025-02-13 VITALS
HEART RATE: 89 BPM | WEIGHT: 206 LBS | SYSTOLIC BLOOD PRESSURE: 113 MMHG | DIASTOLIC BLOOD PRESSURE: 79 MMHG | HEIGHT: 64 IN | BODY MASS INDEX: 35.17 KG/M2

## 2025-02-13 DIAGNOSIS — Z11.3 SCREENING FOR VENEREAL DISEASE: ICD-10-CM

## 2025-02-13 DIAGNOSIS — Z01.419 ENCOUNTER FOR WELL WOMAN EXAM: Primary | ICD-10-CM

## 2025-02-13 DIAGNOSIS — Z12.4 CERVICAL CANCER SCREENING: ICD-10-CM

## 2025-02-13 DIAGNOSIS — Z11.51 SCREENING FOR HUMAN PAPILLOMAVIRUS: ICD-10-CM

## 2025-02-13 PROCEDURE — 99396 PREV VISIT EST AGE 40-64: CPT | Performed by: STUDENT IN AN ORGANIZED HEALTH CARE EDUCATION/TRAINING PROGRAM

## 2025-02-13 RX ORDER — TIRZEPATIDE 5 MG/.5ML
INJECTION, SOLUTION SUBCUTANEOUS
COMMUNITY
Start: 2024-12-10

## 2025-02-13 NOTE — PROGRESS NOTES
Karlene Cid is a 49 y.o. female returns for an annual exam     Chief Complaint   Patient presents with    Annual Exam       No LMP recorded. Patient has had an ablation.  Her periods are light in flow and usually regular with a 26-32 day interval with 3-7 day duration.  She does not have dysmenorrhea.  Problems: problems - breast pain is it nodes or something concern?  Birth Control: vasectomy.  Last Pap: normal obtained 1 year(s) ago.  She does not have a history of ELISHA 2, 3 or cervical cancer.   Last Mammogram: schedule soon  Last colonoscopy: schedule soon.    1. Have you been to the ER, urgent care clinic, or hospitalized since your last visit? No    2. Have you seen or consulted any other health care providers outside of the [facility] since your last visit? No    Examination chaperoned by Katelyn Woodward MA.  
rash, no lesions identified    Neurologic/Psychiatric  Mental Status:  Orientation: grossly oriented to person, place and time  Mood and Affect: mood normal, affect appropriate    Assessment:  Routine gynecologic examination  Her current medical status is satisfactory with no evidence of significant gynecologic issues.  Concern for BL axillary discomfort, has seen breast surgery about 10yrs by her best guess. Offered referral today, declines. Mammogram machine down today, rescheduling screening mammogram. Discussed empower testing, patient with some ?Fhx of breast cancer but mother/grandmother  early in unclear circumstances. Given Be's information.    Plan:  Counseled re: diet, exercise, healthy lifestyle  Return for yearly wellness visits  Recommend annual mammogram       MD Garry [facility]

## 2025-02-21 LAB
CYTOLOGIST CVX/VAG CYTO: NORMAL
CYTOLOGY CVX/VAG DOC CYTO: NORMAL
CYTOLOGY CVX/VAG DOC THIN PREP: NORMAL
DX ICD CODE: NORMAL
HPV GENOTYPE REFLEX: NORMAL
HPV I/H RISK 4 DNA CVX QL PROBE+SIG AMP: NEGATIVE
OTHER STN SPEC: NORMAL
PATHOLOGIST CVX/VAG CYTO: NORMAL
SERVICE CMNT-IMP: NORMAL
STAT OF ADQ CVX/VAG CYTO-IMP: NORMAL

## 2025-03-14 ENCOUNTER — RESULTS FOLLOW-UP (OUTPATIENT)
Age: 50
End: 2025-03-14

## 2025-06-18 ENCOUNTER — HOSPITAL ENCOUNTER (OUTPATIENT)
Facility: HOSPITAL | Age: 50
Discharge: HOME | End: 2025-06-21
Payer: COMMERCIAL

## 2025-06-18 DIAGNOSIS — M54.12 CERVICAL RADICULITIS: ICD-10-CM

## 2025-06-18 PROCEDURE — 72141 MRI NECK SPINE W/O DYE: CPT
